# Patient Record
Sex: MALE | Race: WHITE | Employment: FULL TIME | URBAN - METROPOLITAN AREA
[De-identification: names, ages, dates, MRNs, and addresses within clinical notes are randomized per-mention and may not be internally consistent; named-entity substitution may affect disease eponyms.]

---

## 2017-03-09 ENCOUNTER — OFFICE VISIT (OUTPATIENT)
Dept: FAMILY MEDICINE CLINIC | Facility: CLINIC | Age: 37
End: 2017-03-09

## 2017-03-09 VITALS
BODY MASS INDEX: 26 KG/M2 | HEART RATE: 65 BPM | SYSTOLIC BLOOD PRESSURE: 116 MMHG | WEIGHT: 158 LBS | DIASTOLIC BLOOD PRESSURE: 73 MMHG

## 2017-03-09 DIAGNOSIS — M54.50 ACUTE MIDLINE LOW BACK PAIN WITHOUT SCIATICA: Primary | ICD-10-CM

## 2017-03-09 PROCEDURE — 99212 OFFICE O/P EST SF 10 MIN: CPT | Performed by: FAMILY MEDICINE

## 2017-03-09 PROCEDURE — 99213 OFFICE O/P EST LOW 20 MIN: CPT | Performed by: FAMILY MEDICINE

## 2017-03-09 NOTE — PROGRESS NOTES
Blood pressure 116/73, pulse 65, weight 158 lb (71.668 kg). PATIENT COMPLAINING OF INTERMITTENT BACK PAIN FOR ONE WEEK. NO LEG PAIN FELL ONCE PLAYING SOCCER NO OTHER TRAUMA. NO BOWEL OR BLADDER DYSFUNCTION HE GETS RELIEF WITH IBUPROFEN.         OBJECTIVE

## 2017-03-16 ENCOUNTER — OFFICE VISIT (OUTPATIENT)
Dept: PHYSICAL THERAPY | Age: 37
End: 2017-03-16
Attending: FAMILY MEDICINE
Payer: COMMERCIAL

## 2017-03-16 DIAGNOSIS — M54.50 ACUTE MIDLINE LOW BACK PAIN WITHOUT SCIATICA: Primary | ICD-10-CM

## 2017-03-16 PROCEDURE — 97110 THERAPEUTIC EXERCISES: CPT

## 2017-03-16 PROCEDURE — 97161 PT EVAL LOW COMPLEX 20 MIN: CPT

## 2017-03-16 NOTE — PROGRESS NOTES
LUMBAR SPINE EVALUATION:   Referring Physician: Dr. Gonzalez Friend  Diagnosis: Acute midline low back pain without sciatica (M54.5)    Date of Service: 3/16/2017   Date of Onset: more than 1 year    PATIENT SUMMARY   Yeimy Jay is a 39year old y/o male mechanics for performing planks which may place stress through low back. Decreased R hip mobility may place increased stress through lumbar spine with walking and running.   [address] would benefit from skilled Physical Therapy to address the above impairments management of symptoms  2. In 4 weeks, pt will demo improved hip strength by at least 1 full grade for all deficit motions for improved stability with running and playing soccer  3.  In 4 weeks, pt will demo pain-free lumbar spine range of motion for improv

## 2017-03-17 ENCOUNTER — OFFICE VISIT (OUTPATIENT)
Dept: PHYSICAL THERAPY | Age: 37
End: 2017-03-17
Attending: FAMILY MEDICINE
Payer: COMMERCIAL

## 2017-03-17 PROCEDURE — 97110 THERAPEUTIC EXERCISES: CPT

## 2017-03-17 NOTE — PROGRESS NOTES
Acute midline low back pain without sciatica (M54.5)   Authorized # of Visits:  2/8         Next MD visit: none scheduled  Fall Risk: standard         Precautions: n/a           Medication Changes since last visit?: No  Subjective: Pt 10mins late for appt.

## 2017-03-18 ENCOUNTER — LAB ENCOUNTER (OUTPATIENT)
Dept: LAB | Age: 37
End: 2017-03-18
Attending: PEDIATRICS
Payer: COMMERCIAL

## 2017-03-18 DIAGNOSIS — D57.3 SICKLE-CELL TRAIT (HCC): Primary | ICD-10-CM

## 2017-03-18 PROCEDURE — 85660 RBC SICKLE CELL TEST: CPT

## 2017-03-18 PROCEDURE — 83021 HEMOGLOBIN CHROMOTOGRAPHY: CPT

## 2017-03-18 PROCEDURE — 36415 COLL VENOUS BLD VENIPUNCTURE: CPT

## 2017-03-18 PROCEDURE — 83020 HEMOGLOBIN ELECTROPHORESIS: CPT

## 2017-03-20 ENCOUNTER — OFFICE VISIT (OUTPATIENT)
Dept: PHYSICAL THERAPY | Age: 37
End: 2017-03-20
Attending: FAMILY MEDICINE
Payer: COMMERCIAL

## 2017-03-20 PROCEDURE — 97110 THERAPEUTIC EXERCISES: CPT

## 2017-03-20 NOTE — PROGRESS NOTES
Acute midline low back pain without sciatica (M54.5)   Authorized # of Visits:  3/8         Next MD visit: none scheduled  Fall Risk: standard         Precautions: n/a           Medication Changes since last visit?: No  Subjective: Pt reports back has been

## 2017-03-22 ENCOUNTER — OFFICE VISIT (OUTPATIENT)
Dept: PHYSICAL THERAPY | Age: 37
End: 2017-03-22
Attending: FAMILY MEDICINE
Payer: COMMERCIAL

## 2017-03-22 PROCEDURE — 97110 THERAPEUTIC EXERCISES: CPT

## 2017-03-22 NOTE — PROGRESS NOTES
Acute midline low back pain without sciatica (M54.5)   Authorized # of Visits:  4/10         Next MD visit: none scheduled  Fall Risk: standard         Precautions: n/a           Medication Changes since last visit?: No  Subjective: Pt reports back is feel as able. Progress to BTB for sidely hip abd and sidesteps as able. Charges:  TherEx 3       Total Timed Treatment: 39 min  Total Treatment Time: 40 min

## 2017-03-24 LAB
HGB A2 MFR BLD: 2.3 % (ref 1.5–3.5)
HGB F MFR BLD: 0 % (ref 0–2)
HGB PNL BLD ELPH: 59.5 % (ref 95.5–100)
HGB S BLD QL SOLY: POSITIVE
HGB S MFR BLD: 37.1 %

## 2017-03-27 ENCOUNTER — OFFICE VISIT (OUTPATIENT)
Dept: PHYSICAL THERAPY | Age: 37
End: 2017-03-27
Attending: FAMILY MEDICINE
Payer: COMMERCIAL

## 2017-03-27 PROCEDURE — 97110 THERAPEUTIC EXERCISES: CPT

## 2017-03-27 NOTE — PROGRESS NOTES
Acute midline low back pain without sciatica (M54.5)   Authorized # of Visits:  5/10         Next MD visit: none scheduled  Fall Risk: standard         Precautions: n/a           Medication Changes since last visit?: No  Subjective: Pt reports no more back management of symptoms-MET  2. In 4 weeks, pt will demo improved hip strength by at least 1 full grade for all deficit motions for improved stability with running and playing soccer  3.  In 4 weeks, pt will demo pain-free lumbar spine range of motion for im

## 2017-03-30 ENCOUNTER — APPOINTMENT (OUTPATIENT)
Dept: PHYSICAL THERAPY | Age: 37
End: 2017-03-30
Attending: FAMILY MEDICINE
Payer: COMMERCIAL

## 2017-04-03 ENCOUNTER — APPOINTMENT (OUTPATIENT)
Dept: PHYSICAL THERAPY | Age: 37
End: 2017-04-03
Attending: FAMILY MEDICINE
Payer: COMMERCIAL

## 2017-04-05 ENCOUNTER — TELEPHONE (OUTPATIENT)
Dept: PHYSICAL THERAPY | Age: 37
End: 2017-04-05

## 2017-04-06 ENCOUNTER — APPOINTMENT (OUTPATIENT)
Dept: PHYSICAL THERAPY | Age: 37
End: 2017-04-06
Attending: FAMILY MEDICINE
Payer: COMMERCIAL

## 2017-04-07 ENCOUNTER — TELEPHONE (OUTPATIENT)
Dept: PHYSICAL THERAPY | Age: 37
End: 2017-04-07

## 2017-04-07 NOTE — TELEPHONE ENCOUNTER
Left VM stating next visit and informing pt if he misses one more session without notice, he will be discharged. Asked pt to call back to confirm or cancel remaining sessions.

## 2017-04-11 ENCOUNTER — APPOINTMENT (OUTPATIENT)
Dept: PHYSICAL THERAPY | Age: 37
End: 2017-04-11
Attending: FAMILY MEDICINE
Payer: COMMERCIAL

## 2017-04-13 ENCOUNTER — APPOINTMENT (OUTPATIENT)
Dept: PHYSICAL THERAPY | Age: 37
End: 2017-04-13
Attending: FAMILY MEDICINE
Payer: COMMERCIAL

## 2017-04-14 ENCOUNTER — APPOINTMENT (OUTPATIENT)
Dept: PHYSICAL THERAPY | Age: 37
End: 2017-04-14
Attending: FAMILY MEDICINE
Payer: COMMERCIAL

## 2018-06-06 ENCOUNTER — OFFICE VISIT (OUTPATIENT)
Dept: FAMILY MEDICINE CLINIC | Facility: CLINIC | Age: 38
End: 2018-06-06

## 2018-06-06 VITALS
HEIGHT: 66 IN | DIASTOLIC BLOOD PRESSURE: 76 MMHG | HEART RATE: 72 BPM | RESPIRATION RATE: 18 BRPM | SYSTOLIC BLOOD PRESSURE: 118 MMHG | BODY MASS INDEX: 25.71 KG/M2 | WEIGHT: 160 LBS

## 2018-06-06 DIAGNOSIS — E78.5 HYPERLIPIDEMIA, UNSPECIFIED HYPERLIPIDEMIA TYPE: ICD-10-CM

## 2018-06-06 DIAGNOSIS — Z00.00 ROUTINE PHYSICAL EXAMINATION: Primary | ICD-10-CM

## 2018-06-06 DIAGNOSIS — K21.9 GASTROESOPHAGEAL REFLUX DISEASE WITHOUT ESOPHAGITIS: ICD-10-CM

## 2018-06-06 PROCEDURE — 90715 TDAP VACCINE 7 YRS/> IM: CPT | Performed by: FAMILY MEDICINE

## 2018-06-06 PROCEDURE — 99395 PREV VISIT EST AGE 18-39: CPT | Performed by: FAMILY MEDICINE

## 2018-06-06 PROCEDURE — 90471 IMMUNIZATION ADMIN: CPT | Performed by: FAMILY MEDICINE

## 2018-06-06 RX ORDER — MOMETASONE FUROATE 1 MG/G
1 CREAM TOPICAL 2 TIMES DAILY PRN
Qty: 60 G | Refills: 0 | Status: SHIPPED | OUTPATIENT
Start: 2018-06-06 | End: 2020-07-13

## 2018-06-06 RX ORDER — PANTOPRAZOLE SODIUM 40 MG/1
40 TABLET, DELAYED RELEASE ORAL
Qty: 30 TABLET | Refills: 3 | Status: SHIPPED | OUTPATIENT
Start: 2018-06-06 | End: 2018-12-10

## 2018-06-06 NOTE — PROGRESS NOTES
Blood pressure 118/76, pulse 72, resp. rate 18, height 5' 6\" (1.676 m), weight 160 lb (72.6 kg). REASON FOR VISIT:    Jared Mallory is a 44year old male who presents for an [address]         Patient Active Problem List:     Chest pain found for: [facility]       SPECIFIC DISEASE MONITORING Internal Lab or Procedure   No disease specific diagnoses    ALLERGIES:   No Known Allergies  CURRENT MEDICATIONS:     Current Outpatient Prescriptions:  omeprazole (PRILOSEC) 20 MG Oral Capsule Delay good BS's, no masses, HSM or tenderness  : two descended testes, no masses, no hernia, no penile lesions  RECTAL: deferred  MUSCULOSKELETAL: back is not tender, FROM of the back  EXTREMITIES: no cyanosis, clubbing or edema  NEURO: Oriented times three, c

## 2018-06-09 ENCOUNTER — APPOINTMENT (OUTPATIENT)
Dept: LAB | Age: 38
End: 2018-06-09
Attending: FAMILY MEDICINE
Payer: COMMERCIAL

## 2018-06-09 DIAGNOSIS — E78.5 HYPERLIPIDEMIA, UNSPECIFIED HYPERLIPIDEMIA TYPE: ICD-10-CM

## 2018-06-09 PROCEDURE — 84450 TRANSFERASE (AST) (SGOT): CPT

## 2018-06-09 PROCEDURE — 80048 BASIC METABOLIC PNL TOTAL CA: CPT

## 2018-06-09 PROCEDURE — 84460 ALANINE AMINO (ALT) (SGPT): CPT

## 2018-06-09 PROCEDURE — 84443 ASSAY THYROID STIM HORMONE: CPT

## 2018-06-09 PROCEDURE — 80061 LIPID PANEL: CPT

## 2018-06-09 PROCEDURE — 36415 COLL VENOUS BLD VENIPUNCTURE: CPT

## 2018-06-18 ENCOUNTER — APPOINTMENT (OUTPATIENT)
Dept: LAB | Age: 38
End: 2018-06-18
Attending: FAMILY MEDICINE
Payer: COMMERCIAL

## 2018-06-18 DIAGNOSIS — K21.9 GASTROESOPHAGEAL REFLUX DISEASE WITHOUT ESOPHAGITIS: ICD-10-CM

## 2018-06-18 PROCEDURE — 87338 HPYLORI STOOL AG IA: CPT

## 2018-08-02 ENCOUNTER — TELEPHONE (OUTPATIENT)
Dept: GASTROENTEROLOGY | Facility: CLINIC | Age: 38
End: 2018-08-02

## 2018-08-02 ENCOUNTER — OFFICE VISIT (OUTPATIENT)
Dept: GASTROENTEROLOGY | Facility: CLINIC | Age: 38
End: 2018-08-02
Payer: COMMERCIAL

## 2018-08-02 VITALS
WEIGHT: 163 LBS | HEIGHT: 66.5 IN | DIASTOLIC BLOOD PRESSURE: 73 MMHG | HEART RATE: 72 BPM | BODY MASS INDEX: 25.89 KG/M2 | SYSTOLIC BLOOD PRESSURE: 109 MMHG

## 2018-08-02 DIAGNOSIS — K21.9 GASTROESOPHAGEAL REFLUX DISEASE, ESOPHAGITIS PRESENCE NOT SPECIFIED: Primary | ICD-10-CM

## 2018-08-02 DIAGNOSIS — K21.9 GASTROESOPHAGEAL REFLUX DISEASE WITHOUT ESOPHAGITIS: Primary | ICD-10-CM

## 2018-08-02 PROCEDURE — 99212 OFFICE O/P EST SF 10 MIN: CPT | Performed by: INTERNAL MEDICINE

## 2018-08-02 PROCEDURE — 99243 OFF/OP CNSLTJ NEW/EST LOW 30: CPT | Performed by: INTERNAL MEDICINE

## 2018-08-02 NOTE — PATIENT INSTRUCTIONS
GERD  - diet changes, avoid spicy foods  - ok to stay on omeprazole for now  - EGD with MAC sedation, ok for Elliot      Tips to Control Acid Reflux    To control acid reflux, you’ll need to make some basic diet and lifestyle changes.  The simple steps outli

## 2018-08-02 NOTE — TELEPHONE ENCOUNTER
Scheduled for: EGD 51607   Provider Name: Dr. Bill Decker  Date:  9/6/18  Location:  [facility]  Sedation:  MAC  Time:  9404 (pt is aware to arrive at 0945)   Prep:  NPO after midnight  Meds/Allergies Reconciled?:  Physician reviewed   Diagnosis with codes:  GERD K21. 9

## 2018-08-03 NOTE — PROGRESS NOTES
Chen Gill is a 44year old male.     HPI:   Patient presents with:  Gastro-esophageal Reflux: patient was treated with protonix ,took for a month ,has been on otc prilosec ,    The patient is a 50-year-old male who presents with chronic reflux breath,  No neurologic or dermatologic symptoms. PHYSICAL EXAM:   Blood pressure 109/73, pulse 72, height 5' 6.5\" (1.689 m), weight 163 lb (73.9 kg).     The patient appears their stated age and is in no acute distress  HEENT- anicteric sclera, neck no

## 2018-09-06 ENCOUNTER — ANESTHESIA (OUTPATIENT)
Dept: ENDOSCOPY | Facility: HOSPITAL | Age: 38
End: 2018-09-06
Payer: COMMERCIAL

## 2018-09-06 ENCOUNTER — HOSPITAL ENCOUNTER (OUTPATIENT)
Facility: HOSPITAL | Age: 38
Setting detail: HOSPITAL OUTPATIENT SURGERY
Discharge: HOME | End: 2018-09-06
Attending: INTERNAL MEDICINE | Admitting: INTERNAL MEDICINE
Payer: COMMERCIAL

## 2018-09-06 ENCOUNTER — ANESTHESIA EVENT (OUTPATIENT)
Dept: ENDOSCOPY | Facility: HOSPITAL | Age: 38
End: 2018-09-06
Payer: COMMERCIAL

## 2018-09-06 VITALS
BODY MASS INDEX: 25.11 KG/M2 | DIASTOLIC BLOOD PRESSURE: 86 MMHG | WEIGHT: 160 LBS | HEART RATE: 82 BPM | RESPIRATION RATE: 17 BRPM | HEIGHT: 67 IN | OXYGEN SATURATION: 97 % | SYSTOLIC BLOOD PRESSURE: 100 MMHG

## 2018-09-06 DIAGNOSIS — K21.9 GASTROESOPHAGEAL REFLUX DISEASE, ESOPHAGITIS PRESENCE NOT SPECIFIED: ICD-10-CM

## 2018-09-06 PROCEDURE — 43235 EGD DIAGNOSTIC BRUSH WASH: CPT | Performed by: INTERNAL MEDICINE

## 2018-09-06 PROCEDURE — 0DJ08ZZ INSPECTION OF UPPER INTESTINAL TRACT, VIA NATURAL OR ARTIFICIAL OPENING ENDOSCOPIC: ICD-10-PCS | Performed by: INTERNAL MEDICINE

## 2018-09-06 RX ORDER — MIDAZOLAM HYDROCHLORIDE 1 MG/ML
INJECTION INTRAMUSCULAR; INTRAVENOUS AS NEEDED
Status: DISCONTINUED | OUTPATIENT
Start: 2018-09-06 | End: 2018-09-06 | Stop reason: SURG

## 2018-09-06 RX ORDER — NALOXONE HYDROCHLORIDE 0.4 MG/ML
80 INJECTION, SOLUTION INTRAMUSCULAR; INTRAVENOUS; SUBCUTANEOUS AS NEEDED
Status: DISCONTINUED | OUTPATIENT
Start: 2018-09-06 | End: 2018-09-06

## 2018-09-06 RX ORDER — SODIUM CHLORIDE, SODIUM LACTATE, POTASSIUM CHLORIDE, CALCIUM CHLORIDE 600; 310; 30; 20 MG/100ML; MG/100ML; MG/100ML; MG/100ML
INJECTION, SOLUTION INTRAVENOUS CONTINUOUS
Status: DISCONTINUED | OUTPATIENT
Start: 2018-09-06 | End: 2018-09-06

## 2018-09-06 RX ORDER — LIDOCAINE HYDROCHLORIDE 10 MG/ML
INJECTION, SOLUTION EPIDURAL; INFILTRATION; INTRACAUDAL; PERINEURAL AS NEEDED
Status: DISCONTINUED | OUTPATIENT
Start: 2018-09-06 | End: 2018-09-06 | Stop reason: SURG

## 2018-09-06 RX ADMIN — LIDOCAINE HYDROCHLORIDE 20 MG: 10 INJECTION, SOLUTION EPIDURAL; INFILTRATION; INTRACAUDAL; PERINEURAL at 11:03:00

## 2018-09-06 RX ADMIN — MIDAZOLAM HYDROCHLORIDE 2 MG: 1 INJECTION INTRAMUSCULAR; INTRAVENOUS at 11:01:00

## 2018-09-06 RX ADMIN — SODIUM CHLORIDE, SODIUM LACTATE, POTASSIUM CHLORIDE, CALCIUM CHLORIDE: 600; 310; 30; 20 INJECTION, SOLUTION INTRAVENOUS at 11:16:00

## 2018-09-06 NOTE — H&P
History & Physical Examination    Patient Name: Walker Franks  MRN: [id number]  [facility]: [id number]  YOB: 1980    Diagnosis: gerd    Prescriptions Prior to Admission:  Pantoprazole Sodium 40 MG Oral Tab EC Take 1 tablet (40 mg total) b

## 2018-09-06 NOTE — ANESTHESIA PREPROCEDURE EVALUATION
Anesthesia PreOp Note    HPI:     Andria Kwan is a 44year old male who presents for preoperative consultation requested by: Ian Gil MD    Date of Surgery: 9/6/2018    Procedure(s):  ESOPHAGOGASTRODUODENOSCOPY (EGD)  Indication: Janes Santana status:   Spouse name: N/A    Years of education: N/A  Number of children: N/A     Occupational History  None on file     Social History Main Topics   Smoking status: Never Smoker    Smokeless tobacco: Never Used    Alcohol use Yes  6.6 oz/week    1 best of my ability. The patient desires the anesthetic management as planned.   Talha Yeung  9/6/2018 10:59 AM

## 2018-09-06 NOTE — OPERATIVE REPORT
[facility] Endoscopy Report      Preoperative Diagnosis:  - refractory GERD      Postoperative Diagnosis:  - reflux esophagitis      Procedure:    Esophagogastroduodenoscopy       Surgeon:  Bere Sandhu M.D.     Anesthesia:  MAC sedation,

## 2018-09-06 NOTE — ANESTHESIA POSTPROCEDURE EVALUATION
Patient: Peter Adams    Procedure Summary     Date:  09/06/18 Room / Location:  [address] [place] [address] ENDOSCOPY 03 / [address] [place] [address] ENDOSCOPY    Anesthesia Start:  1100 Anesthesia Stop:  2054    Procedure:  ESOPHAGOGASTRODUODENOSCOPY (EGD) (N/A ) Diagnosis:       Dennis Brooks

## 2018-12-10 RX ORDER — PANTOPRAZOLE SODIUM 40 MG/1
TABLET, DELAYED RELEASE ORAL
Qty: 30 TABLET | Refills: 0 | Status: SHIPPED | OUTPATIENT
Start: 2018-12-10 | End: 2019-01-06

## 2019-01-06 RX ORDER — PANTOPRAZOLE SODIUM 40 MG/1
TABLET, DELAYED RELEASE ORAL
Qty: 90 TABLET | Refills: 3 | Status: SHIPPED | OUTPATIENT
Start: 2019-01-06 | End: 2020-07-13

## 2019-08-26 ENCOUNTER — TELEPHONE (OUTPATIENT)
Dept: FAMILY MEDICINE CLINIC | Facility: CLINIC | Age: 39
End: 2019-08-26

## 2020-05-18 ENCOUNTER — LAB ENCOUNTER (OUTPATIENT)
Dept: LAB | Facility: HOSPITAL | Age: 40
End: 2020-05-18
Attending: FAMILY MEDICINE
Payer: COMMERCIAL

## 2020-05-18 ENCOUNTER — TELEMEDICINE (OUTPATIENT)
Dept: FAMILY MEDICINE CLINIC | Facility: CLINIC | Age: 40
End: 2020-05-18
Payer: COMMERCIAL

## 2020-05-18 ENCOUNTER — NURSE TRIAGE (OUTPATIENT)
Dept: FAMILY MEDICINE CLINIC | Facility: CLINIC | Age: 40
End: 2020-05-18

## 2020-05-18 DIAGNOSIS — R50.9 FEVER, UNSPECIFIED FEVER CAUSE: ICD-10-CM

## 2020-05-18 DIAGNOSIS — R50.9 FEVER, UNSPECIFIED FEVER CAUSE: Primary | ICD-10-CM

## 2020-05-18 PROCEDURE — 99213 OFFICE O/P EST LOW 20 MIN: CPT | Performed by: FAMILY MEDICINE

## 2020-05-18 NOTE — TELEPHONE ENCOUNTER
Please reply to pool: EM RN TRIAGE    Action Requested: Summary for Provider     []  Critical Lab, Recommendations Needed  [] Need Additional Advice  []   FYI    []   Need Orders  [] Need Medications Sent to Pharmacy  []  Other     SUMMARY: Offered Doxim

## 2020-05-18 NOTE — PROGRESS NOTES
VIDEO VISIT    Presents today reporting that he has been exposed to COVID-19. He reports that he had a temperature of 104 degrees yesterday. He does have a cough. Denies nocturnal cough or dyspnea. No sore throat.   No nasal congestion or phlegm product

## 2020-05-20 ENCOUNTER — TELEPHONE (OUTPATIENT)
Dept: FAMILY MEDICINE CLINIC | Facility: CLINIC | Age: 40
End: 2020-05-20

## 2020-05-20 ENCOUNTER — PATIENT OUTREACH (OUTPATIENT)
Dept: CASE MANAGEMENT | Age: 40
End: 2020-05-20

## 2020-05-20 NOTE — TELEPHONE ENCOUNTER
Spoke with patient today for day 1 home monitoring. Patient tested COVID+ on 5/18/20.  Patient feels his symptoms have returned to normal as of Monday (day he was tested) and patient is asking if he can start taking Aspirin as he saw on the news, patients

## 2020-05-20 NOTE — PROGRESS NOTES
Home Monitoring Condition Update    Covid19+ test date: 5/18/20      Consent Verification:  Assessment Completed With: Patient  HIPAA Verified?   Yes    COVID-19 HOME MONITORING 5/20/2020   Temperature 97.8   Reading From Mouth   Pulse 77   Exertion Level understanding and denies any concerns at time of call. Patient advised to inform their Employee Health department or Manager when they have tested positive for COVID-19.       The patient was also directed to continue to isolate away from other househo

## 2020-05-21 ENCOUNTER — PATIENT OUTREACH (OUTPATIENT)
Dept: CASE MANAGEMENT | Age: 40
End: 2020-05-21

## 2020-05-21 NOTE — PROGRESS NOTES
Home Monitoring Condition Update    Covid19+ test date: 5/18/2020      Consent Verification:  Assessment Completed With: Patient  HIPAA Verified?   Yes    COVID-19 HOME MONITORING 5/21/2020   Temperature 98   Reading From Mouth   Pulse 79   Pulse taken fr with worsening symptoms,  Patient encouraged to move about the room to prevent blood clots--patient verbalizes understanding and agreement. Patient verbalized understanding that a nurse will be contacting him/her tomorrow.   He will also do his breathing

## 2020-05-21 NOTE — TELEPHONE ENCOUNTER
The patient was informed. He would like to know what can he take for his occasional wheezing. Denies any shortness of breath. He also has a runny nose after sneezing and is asking if he can take a Claritin? Denies any post nasal drip or watery eyes.

## 2020-05-22 ENCOUNTER — PATIENT OUTREACH (OUTPATIENT)
Dept: CASE MANAGEMENT | Age: 40
End: 2020-05-22

## 2020-05-22 NOTE — PROGRESS NOTES
Home Monitoring Condition Update    Covid19+ test date: 5/18/2020      Consent Verification:  Assessment Completed With: Patient  HIPAA Verified?   Yes    COVID-19 HOME MONITORING 5/22/2020   Temperature 97.3   Reading From Mouth   Pulse 74   Pulse taken DO [facility] [place]           Patient advised to inform their Employee Health department or Manager when they have tested positive for COVID-19.       The patient was also directed to continue to isolate away from other household members when possible and

## 2020-05-26 ENCOUNTER — VIRTUAL PHONE E/M (OUTPATIENT)
Dept: FAMILY MEDICINE CLINIC | Facility: CLINIC | Age: 40
End: 2020-05-26
Payer: COMMERCIAL

## 2020-05-26 DIAGNOSIS — U07.1 COVID-19: Primary | ICD-10-CM

## 2020-05-26 PROCEDURE — 99441 PHONE E/M BY PHYS 5-10 MIN: CPT | Performed by: FAMILY MEDICINE

## 2020-05-26 NOTE — PROGRESS NOTES
Virtual Telephone Check-In    Michael Santos verbally consents to a Virtual/Telephone Check-In visit on 05/26/20. Patient has been referred to the [facility] website at [email or web address] to review the yearly Consent to Treat document.     Patient

## 2020-07-03 ENCOUNTER — TELEMEDICINE (OUTPATIENT)
Dept: FAMILY MEDICINE CLINIC | Facility: CLINIC | Age: 40
End: 2020-07-03

## 2020-07-03 DIAGNOSIS — J34.89 NASAL OBSTRUCTION: Primary | ICD-10-CM

## 2020-07-03 PROCEDURE — 99213 OFFICE O/P EST LOW 20 MIN: CPT | Performed by: FAMILY MEDICINE

## 2020-07-03 NOTE — PROGRESS NOTES
VIDEO VISIT    Patient presents today complaining of snoring and frequent excessive daytime somnolence. He reports he has broken his nose previously. He reports he falls asleep on long car rides and when he is watching television.     Objective patient is

## 2020-07-11 ENCOUNTER — OFFICE VISIT (OUTPATIENT)
Dept: FAMILY MEDICINE CLINIC | Facility: CLINIC | Age: 40
End: 2020-07-11
Payer: COMMERCIAL

## 2020-07-11 VITALS
BODY MASS INDEX: 26.37 KG/M2 | HEIGHT: 67 IN | DIASTOLIC BLOOD PRESSURE: 83 MMHG | SYSTOLIC BLOOD PRESSURE: 119 MMHG | WEIGHT: 168 LBS | HEART RATE: 66 BPM

## 2020-07-11 DIAGNOSIS — R73.01 IMPAIRED FASTING GLUCOSE: ICD-10-CM

## 2020-07-11 DIAGNOSIS — E78.01 FAMILIAL HYPERCHOLESTEROLEMIA: ICD-10-CM

## 2020-07-11 DIAGNOSIS — J34.89 NASAL OBSTRUCTION: Primary | ICD-10-CM

## 2020-07-11 PROCEDURE — 99213 OFFICE O/P EST LOW 20 MIN: CPT | Performed by: FAMILY MEDICINE

## 2020-07-11 RX ORDER — FLUTICASONE PROPIONATE 50 MCG
2 SPRAY, SUSPENSION (ML) NASAL DAILY
Qty: 1 BOTTLE | Refills: 3 | Status: SHIPPED | OUTPATIENT
Start: 2020-07-11 | End: 2020-07-13

## 2020-07-11 NOTE — PROGRESS NOTES
Blood pressure 119/83, pulse 66, height 5' 7\" (1.702 m), weight 168 lb (76.2 kg). Patient presents today complaining of constant nasal obstruction and snoring. He does have somnolence. He has been tested for sleep apnea previously was negative.   He h

## 2020-07-13 ENCOUNTER — TELEPHONE (OUTPATIENT)
Dept: FAMILY MEDICINE CLINIC | Facility: CLINIC | Age: 40
End: 2020-07-13

## 2020-07-13 RX ORDER — MOMETASONE 50 UG/1
1 SPRAY, METERED NASAL DAILY
Qty: 1 INHALER | Refills: 2 | Status: SHIPPED | OUTPATIENT
Start: 2020-07-13 | End: 2021-05-07 | Stop reason: ALTCHOICE

## 2020-07-13 NOTE — TELEPHONE ENCOUNTER
Fax received requesting alternative. Note states plan does not cover med prescribed. Per RX benefit plan alternative meds include:  Mometasone spr 50mg. Please fax back with approval along with strength, directions, quantity and refills.      Current Out

## 2020-07-16 ENCOUNTER — OFFICE VISIT (OUTPATIENT)
Dept: OTOLARYNGOLOGY | Facility: CLINIC | Age: 40
End: 2020-07-16
Payer: COMMERCIAL

## 2020-07-16 VITALS
HEIGHT: 66 IN | BODY MASS INDEX: 27.32 KG/M2 | SYSTOLIC BLOOD PRESSURE: 118 MMHG | TEMPERATURE: 98 F | WEIGHT: 170 LBS | DIASTOLIC BLOOD PRESSURE: 82 MMHG

## 2020-07-16 DIAGNOSIS — J34.2 DEVIATED NASAL SEPTUM: ICD-10-CM

## 2020-07-16 DIAGNOSIS — G47.30 SLEEP APNEA, UNSPECIFIED TYPE: Primary | ICD-10-CM

## 2020-07-16 PROCEDURE — 99243 OFF/OP CNSLTJ NEW/EST LOW 30: CPT | Performed by: OTOLARYNGOLOGY

## 2020-07-16 PROCEDURE — 3008F BODY MASS INDEX DOCD: CPT | Performed by: OTOLARYNGOLOGY

## 2020-07-16 PROCEDURE — 3074F SYST BP LT 130 MM HG: CPT | Performed by: OTOLARYNGOLOGY

## 2020-07-16 PROCEDURE — 3079F DIAST BP 80-89 MM HG: CPT | Performed by: OTOLARYNGOLOGY

## 2020-07-16 RX ORDER — OMEPRAZOLE 20 MG/1
20 CAPSULE, DELAYED RELEASE ORAL EVERY MORNING
COMMUNITY
End: 2021-05-07 | Stop reason: ALTCHOICE

## 2020-07-16 RX ORDER — MONTELUKAST SODIUM 10 MG/1
10 TABLET ORAL NIGHTLY
Qty: 30 TABLET | Refills: 3 | Status: SHIPPED | OUTPATIENT
Start: 2020-07-16 | End: 2021-05-07 | Stop reason: ALTCHOICE

## 2020-07-16 RX ORDER — AZELASTINE 1 MG/ML
2 SPRAY, METERED NASAL 2 TIMES DAILY
Qty: 1 BOTTLE | Refills: 3 | Status: SHIPPED | OUTPATIENT
Start: 2020-07-16 | End: 2021-05-07 | Stop reason: ALTCHOICE

## 2020-07-16 RX ORDER — OMEPRAZOLE 40 MG/1
40 CAPSULE, DELAYED RELEASE ORAL DAILY
Qty: 30 CAPSULE | Refills: 2 | Status: SHIPPED | OUTPATIENT
Start: 2020-07-16 | End: 2021-05-18

## 2020-07-17 ENCOUNTER — TELEPHONE (OUTPATIENT)
Dept: OTOLARYNGOLOGY | Facility: CLINIC | Age: 40
End: 2020-07-17

## 2020-07-17 NOTE — TELEPHONE ENCOUNTER
Rn spoke to pt but pt cannot remember said not sure Jorge L Dc tried to call but office were closed ,will try again.

## 2020-07-17 NOTE — PROGRESS NOTES
Shanna Peacock is a 35year old male.   Patient presents with:  Sinus Problem: pt stated feel congested  and hard to breath  Other: patient snores and woke grasping for breath      HISTORY OF PRESENT ILLNESS  He presents with a history of feeling c loss.   ENMT Negative Drooling. Eyes Negative Blurred vision and vision changes. Respiratory Negative Dyspnea and wheezing. Cardio Negative Chest pain, irregular heartbeat/palpitations and syncope. GI Negative Abdominal pain and diarrhea.    Stephan Soler Moderate hypertrophy       Current Outpatient Medications:   •  omeprazole 20 MG Oral Capsule Delayed Release, Take 20 mg by mouth every morning., Disp: , Rfl:   •  Montelukast Sodium 10 MG Oral Tab, Take 1 tablet (10 mg total) by mouth nightly., Disp: 30

## 2020-07-17 NOTE — TELEPHONE ENCOUNTER
Jennifer Black MD  [facility] Clinical Staff             Please find previous copy of sleep study performed years ago.  States that this was performed at home but ordered by an [facility] physician

## 2020-08-22 ENCOUNTER — OFFICE VISIT (OUTPATIENT)
Dept: OTOLARYNGOLOGY | Facility: CLINIC | Age: 40
End: 2020-08-22
Payer: COMMERCIAL

## 2020-08-22 VITALS
WEIGHT: 168 LBS | SYSTOLIC BLOOD PRESSURE: 122 MMHG | BODY MASS INDEX: 27 KG/M2 | TEMPERATURE: 97 F | HEIGHT: 66 IN | DIASTOLIC BLOOD PRESSURE: 80 MMHG

## 2020-08-22 DIAGNOSIS — G47.30 SLEEP APNEA, UNSPECIFIED TYPE: Primary | ICD-10-CM

## 2020-08-22 DIAGNOSIS — J34.2 DEVIATED NASAL SEPTUM: ICD-10-CM

## 2020-08-22 PROCEDURE — 3074F SYST BP LT 130 MM HG: CPT | Performed by: OTOLARYNGOLOGY

## 2020-08-22 PROCEDURE — 99213 OFFICE O/P EST LOW 20 MIN: CPT | Performed by: OTOLARYNGOLOGY

## 2020-08-22 PROCEDURE — 3008F BODY MASS INDEX DOCD: CPT | Performed by: OTOLARYNGOLOGY

## 2020-08-22 PROCEDURE — 3079F DIAST BP 80-89 MM HG: CPT | Performed by: OTOLARYNGOLOGY

## 2020-08-22 RX ORDER — OMEPRAZOLE 40 MG/1
40 CAPSULE, DELAYED RELEASE ORAL DAILY
Qty: 30 CAPSULE | Refills: 3 | Status: SHIPPED | OUTPATIENT
Start: 2020-08-22 | End: 2020-12-11

## 2020-08-22 NOTE — PROGRESS NOTES
Spencer Myers is a 35year old male.   Patient presents with:  Sleep Problem: Patient here fro sleep apnea f/up      HISTORY OF PRESENT ILLNESS    He presents with a history of feeling congested in his nose and having difficulty breathing through History:   Diagnosis Date   • Acid reflux    • Esophageal reflux    • Lipid screening 1/2/2009       Past Surgical History:   Procedure Laterality Date   • ARTHROSCOPY OF JOINT UNLISTED     • ESOPHAGOGASTRODUODENOSCOPY (EGD) N/A 9/6/2018    Performed by Derik Varghese Detail Normal Submental. Submandibular. Anterior cervical. Posterior cervical. Supraclavicular.         Nose/Mouth/Throat Normal External nose - Normal. Lips/teeth/gums - Normal. Tonsils - Normal. Oropharynx - Normal.   Nose/Mouth/Throat Normal Nares - Righ

## 2020-08-24 ENCOUNTER — TELEPHONE (OUTPATIENT)
Dept: OTOLARYNGOLOGY | Facility: CLINIC | Age: 40
End: 2020-08-24

## 2020-08-24 NOTE — TELEPHONE ENCOUNTER
Rn spoke to pt need to sign VITA form to get the copy of Sleep Study from [facility] and Fax to#[phone],patient requesting to have the form fax to [phone] for pt to sign and send form back to our office fax#[phone].

## 2020-08-27 NOTE — TELEPHONE ENCOUNTER
Informed pt that Rn received a response from [facility] record and they don't have pt record there,pt cannot remember where he had the sleep study test done. Kwaku Chong made aware. Advised pt to call the doctor who ordered the test and ask for a

## 2020-12-11 RX ORDER — OMEPRAZOLE 40 MG/1
CAPSULE, DELAYED RELEASE ORAL
Qty: 30 CAPSULE | Refills: 2 | Status: SHIPPED | OUTPATIENT
Start: 2020-12-11 | End: 2021-02-17

## 2020-12-11 NOTE — TELEPHONE ENCOUNTER
This refill request is being sent to the provider for the following reason:    []Patient did not complete follow up recommendations,pt last visit on 8/22/20,please advise.

## 2021-02-17 RX ORDER — OMEPRAZOLE 40 MG/1
CAPSULE, DELAYED RELEASE ORAL
Qty: 30 CAPSULE | Refills: 2 | Status: SHIPPED | OUTPATIENT
Start: 2021-02-17 | End: 2021-04-30

## 2021-02-18 ENCOUNTER — TELEPHONE (OUTPATIENT)
Dept: OTOLARYNGOLOGY | Facility: CLINIC | Age: 41
End: 2021-02-18

## 2021-02-18 NOTE — TELEPHONE ENCOUNTER
Current Outpatient Medications   Medication Sig Dispense Refill   • OMEPRAZOLE 40 MG Oral Capsule Delayed Release TAKE 1 CAPSULE BY MOUTH EVERY DAY 30 capsule 2            Prior Auth received from [facility] [place]    Alternative RX requested/ [employer] fo

## 2021-02-18 NOTE — TELEPHONE ENCOUNTER
Rn tried to call #[phone] but [facility] line in under technical difficulty ,will try later.

## 2021-02-19 NOTE — TELEPHONE ENCOUNTER
Rn called  #[phone] [employer] to get prior auth for Omeprazole 40 mg per Roger Escobar it's approved [id number] valid from 2/19/21 thru 2/19/22. Rn informed pt pharmacy.

## 2021-03-03 DIAGNOSIS — Z23 NEED FOR VACCINATION: ICD-10-CM

## 2021-04-30 ENCOUNTER — OFFICE VISIT (OUTPATIENT)
Dept: FAMILY MEDICINE CLINIC | Facility: CLINIC | Age: 41
End: 2021-04-30
Payer: COMMERCIAL

## 2021-04-30 VITALS
SYSTOLIC BLOOD PRESSURE: 126 MMHG | HEART RATE: 67 BPM | BODY MASS INDEX: 26.52 KG/M2 | WEIGHT: 165 LBS | HEIGHT: 66 IN | DIASTOLIC BLOOD PRESSURE: 70 MMHG

## 2021-04-30 DIAGNOSIS — Z30.09 VASECTOMY EVALUATION: Primary | ICD-10-CM

## 2021-04-30 DIAGNOSIS — K21.9 GASTROESOPHAGEAL REFLUX DISEASE WITHOUT ESOPHAGITIS: ICD-10-CM

## 2021-04-30 PROCEDURE — 3074F SYST BP LT 130 MM HG: CPT | Performed by: FAMILY MEDICINE

## 2021-04-30 PROCEDURE — 3078F DIAST BP <80 MM HG: CPT | Performed by: FAMILY MEDICINE

## 2021-04-30 PROCEDURE — 99213 OFFICE O/P EST LOW 20 MIN: CPT | Performed by: FAMILY MEDICINE

## 2021-04-30 PROCEDURE — 3008F BODY MASS INDEX DOCD: CPT | Performed by: FAMILY MEDICINE

## 2021-04-30 NOTE — PATIENT INSTRUCTIONS
En qué consiste la vasectomía  La vasectomía es un procedimiento sencillo y seguro para impedir que un hombre pueda tener hijos (esterilización) y es el método anticonceptivo más eficaz en los hombres.   El sistema reproductor  Para que el embarazo sea po cierta cantidad de espermatozoides activos permanecen todavía en el sistema reproductor. Se necesita aproximadamente 3 meses y numerosas eyaculaciones hasta que el semen quede completamente clau de espermatozoides.  Mientras tanto, necesitará utilizar algú deferente. [place] puede ocurrir en casos muy poco frecuentes. Puede hacer que sea fértil de nuevo. [place] puede ocasionar un embarazo no planificado. · Anticuerpos espermáticos.  Desarrollar anticuerpos es vanesa reacción común del organismo a la absorción de mae reducir los síntomas. Si es necesario, martinez proveedor de atención médica puede recetarle medicamentos. Los síntomas suelen mejorar con el tratamiento, holli si jessy se suspende, los síntomas suelen regresar después de unos pocos meses.  Así que la mayoría de l funcionar mejor, holli tardan un poco más en surtir [place] un antiácido de 30 a 60 minutos después de comer y a la hora de [place], holli no a la misma hora que un bloqueador de ácido. Intente no amaury medicamentos beni ibuprofeno y [place].  Si es Pueden ayudar a aliviar sridhar síntomas. Mantenga la Allean Alderman  El reflujo ocurre con mayor frecuencia cuando se está [place], pues los fluidos estomacales pueden subir con mayor facilidad.  Levantar la cabecera de martinez cama de 4 a 6 pulgadas puede

## 2021-04-30 NOTE — PROGRESS NOTES
Blood pressure 126/70, pulse 67, height 5' 6\" (1.676 m), weight 165 lb (74.8 kg). Patient presents today following up for GERD. Last endoscopy almost 3 years ago. Still requires omeprazole 40 mg daily.   Also requesting vasectomy referral and informat

## 2021-05-07 ENCOUNTER — OFFICE VISIT (OUTPATIENT)
Dept: OTOLARYNGOLOGY | Facility: CLINIC | Age: 41
End: 2021-05-07
Payer: COMMERCIAL

## 2021-05-07 VITALS
SYSTOLIC BLOOD PRESSURE: 106 MMHG | BODY MASS INDEX: 26.52 KG/M2 | DIASTOLIC BLOOD PRESSURE: 66 MMHG | HEIGHT: 66.5 IN | WEIGHT: 167 LBS

## 2021-05-07 DIAGNOSIS — K21.9 GASTROESOPHAGEAL REFLUX DISEASE, UNSPECIFIED WHETHER ESOPHAGITIS PRESENT: Primary | ICD-10-CM

## 2021-05-07 PROCEDURE — 3078F DIAST BP <80 MM HG: CPT | Performed by: OTOLARYNGOLOGY

## 2021-05-07 PROCEDURE — 3008F BODY MASS INDEX DOCD: CPT | Performed by: OTOLARYNGOLOGY

## 2021-05-07 PROCEDURE — 99214 OFFICE O/P EST MOD 30 MIN: CPT | Performed by: OTOLARYNGOLOGY

## 2021-05-07 PROCEDURE — 3074F SYST BP LT 130 MM HG: CPT | Performed by: OTOLARYNGOLOGY

## 2021-05-07 NOTE — PROGRESS NOTES
Zion Carrera is a 38year old male. Patient presents with: Follow - Up: acid reflux patient states the medication is controlling his gerd symptoms .       HISTORY OF PRESENT ILLNESS  He presents with a history of feeling congested in his nose a Not on file      Years of education: Not on file      Highest education level: Not on file    Tobacco Use      Smoking status: Never Smoker      Smokeless tobacco: Never Used    Substance and Sexual Activity      Alcohol use:  Yes        Alcohol/week: 11.0 nerves II through XII grossly intact.    Head/Face Normal Facial features - Normal. Eyebrows - Normal. Skull - Normal.        Nasopharynx Normal External nose - Normal. Lips/teeth/gums - Normal. Tonsils - Normal. Oropharynx - Normal.   Ears Normal Inspectio

## 2021-05-10 ENCOUNTER — TELEPHONE (OUTPATIENT)
Dept: SURGERY | Facility: CLINIC | Age: 41
End: 2021-05-10

## 2021-05-17 ENCOUNTER — OFFICE VISIT (OUTPATIENT)
Dept: SURGERY | Facility: CLINIC | Age: 41
End: 2021-05-17
Payer: COMMERCIAL

## 2021-05-17 VITALS
DIASTOLIC BLOOD PRESSURE: 79 MMHG | HEART RATE: 76 BPM | WEIGHT: 166 LBS | HEIGHT: 66.5 IN | BODY MASS INDEX: 26.36 KG/M2 | SYSTOLIC BLOOD PRESSURE: 121 MMHG

## 2021-05-17 DIAGNOSIS — Z30.09 VASECTOMY EVALUATION: Primary | ICD-10-CM

## 2021-05-17 PROCEDURE — 3074F SYST BP LT 130 MM HG: CPT | Performed by: UROLOGY

## 2021-05-17 PROCEDURE — 3078F DIAST BP <80 MM HG: CPT | Performed by: UROLOGY

## 2021-05-17 PROCEDURE — 3008F BODY MASS INDEX DOCD: CPT | Performed by: UROLOGY

## 2021-05-17 PROCEDURE — 99244 OFF/OP CNSLTJ NEW/EST MOD 40: CPT | Performed by: UROLOGY

## 2021-05-17 RX ORDER — DIAZEPAM 10 MG/1
10 TABLET ORAL ONCE
Qty: 1 TABLET | Refills: 0 | Status: SHIPPED | OUTPATIENT
Start: 2021-05-17 | End: 2021-05-17

## 2021-05-17 RX ORDER — HYDROCODONE BITARTRATE AND ACETAMINOPHEN 5; 325 MG/1; MG/1
2 TABLET ORAL
Qty: 2 TABLET | Refills: 0 | Status: SHIPPED | OUTPATIENT
Start: 2021-05-17 | End: 2021-05-17

## 2021-05-17 NOTE — PROGRESS NOTES
[facility], [facility] Urology  Initial Office Consultation    HPI:   Neeta Crabtree is a 38year old male here today for consultation at the request of, and a copy of this note will be sent to, Luis Rendon DO.   He is accompanied by his wife who w icterus. Cardiovascular:      Rate and Rhythm: Normal rate. Pulmonary:      Effort: Pulmonary effort is normal.   Abdominal:      General: There is no distension. Palpations: Abdomen is soft. Tenderness: There is no abdominal tenderness.    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 · Repeat vasectomy is necessary in ?1% of vasectomies, provided that a technique for vas occlusion known to have a low occlusive failure rate has been used.     · Patients should re

## 2021-05-18 RX ORDER — OMEPRAZOLE 40 MG/1
CAPSULE, DELAYED RELEASE ORAL
Qty: 90 CAPSULE | Refills: 0 | Status: SHIPPED | OUTPATIENT
Start: 2021-05-18 | End: 2021-08-16

## 2021-05-19 ENCOUNTER — TELEPHONE (OUTPATIENT)
Dept: SURGERY | Facility: CLINIC | Age: 41
End: 2021-05-19

## 2021-05-19 NOTE — TELEPHONE ENCOUNTER
Called pt's insurance [employer] to obtain prior authorization for pts in-office Vasectomy scheduled 07/08/2021.  I spoke with Gem Batres who informed me that no prior authorization or pre determination was needed for this procedure with the codes provided ( [phone], [phone]

## 2021-07-06 ENCOUNTER — TELEPHONE (OUTPATIENT)
Dept: SURGERY | Facility: CLINIC | Age: 41
End: 2021-07-06

## 2021-07-06 RX ORDER — DIAZEPAM 10 MG/1
TABLET ORAL
Refills: 0 | Status: CANCELLED | OUTPATIENT
Start: 2021-07-06

## 2021-07-06 RX ORDER — HYDROCODONE BITARTRATE AND ACETAMINOPHEN 5; 325 MG/1; MG/1
TABLET ORAL
Refills: 0 | Status: CANCELLED | OUTPATIENT
Start: 2021-07-06

## 2021-07-06 RX ORDER — HYDROCODONE BITARTRATE AND ACETAMINOPHEN 5; 325 MG/1; MG/1
TABLET ORAL
COMMUNITY
Start: 2021-05-17

## 2021-07-06 RX ORDER — DIAZEPAM 10 MG/1
10 TABLET ORAL ONCE
Qty: 1 TABLET | Refills: 0 | Status: SHIPPED | OUTPATIENT
Start: 2021-07-06 | End: 2021-07-06

## 2021-07-06 RX ORDER — HYDROCODONE BITARTRATE AND ACETAMINOPHEN 5; 325 MG/1; MG/1
2 TABLET ORAL ONCE
Qty: 2 TABLET | Refills: 0 | Status: SHIPPED | OUTPATIENT
Start: 2021-07-06 | End: 2021-07-06

## 2021-07-06 RX ORDER — DIAZEPAM 10 MG/1
TABLET ORAL
COMMUNITY
Start: 2021-05-17

## 2021-07-06 NOTE — TELEPHONE ENCOUNTER
I called pt back verified name/ pt agrees to switch vasectomy to Friday at 10:00am pt states he need scripts sent to confirmed pharmacy he never picked up in May 2021 will sent to [facility]NY, Omari Kirby to sent to pharmacy, I also went over detail instr

## 2021-07-06 NOTE — TELEPHONE ENCOUNTER
TCB and to check pt precioust  Dr. Clifton Wade will not be in the office on Thursday July 8, 2021, when pt calls back I need to speak to him to see if he can come on Friday July 9, 2021.  PLEASE TRANSFER CALL TO Jose Castillo

## 2021-07-06 NOTE — TELEPHONE ENCOUNTER
Pt states ok for Friday 7/9 pt also lost scripts for medication to be taken before procedure please advise

## 2021-07-09 ENCOUNTER — PROCEDURE (OUTPATIENT)
Dept: SURGERY | Facility: CLINIC | Age: 41
End: 2021-07-09
Payer: COMMERCIAL

## 2021-07-09 VITALS — HEART RATE: 58 BPM | SYSTOLIC BLOOD PRESSURE: 121 MMHG | DIASTOLIC BLOOD PRESSURE: 83 MMHG

## 2021-07-09 DIAGNOSIS — Z98.52 S/P VASECTOMY: ICD-10-CM

## 2021-07-09 DIAGNOSIS — Z30.2 ENCOUNTER FOR STERILIZATION: Primary | ICD-10-CM

## 2021-07-09 PROCEDURE — 55250 REMOVAL OF SPERM DUCT(S): CPT | Performed by: UROLOGY

## 2021-07-09 PROCEDURE — 3079F DIAST BP 80-89 MM HG: CPT | Performed by: UROLOGY

## 2021-07-09 PROCEDURE — 3074F SYST BP LT 130 MM HG: CPT | Performed by: UROLOGY

## 2021-07-09 NOTE — PROCEDURES
UROLOGY PROCEDURE NOTE  NO-SCALPEL BILATERAL VASECTOMY      PREOPERATIVE DIAGNOSIS: Desires voluntary sterilization - bilateral vasectomy. POSTOPERATIVE DIAGNOSIS: Desires voluntary sterilization - bilateral vasectomy.     PROCEDURE PERFORMED: Voluntary segment of vas deferens was sent separately in formalin to pathology for identification. The patient tolerated the procedure well. Post-procedural instructions provided verbally and in written form.      Paul Fragoso MD  07/09/21

## 2021-08-16 RX ORDER — OMEPRAZOLE 40 MG/1
CAPSULE, DELAYED RELEASE ORAL
Qty: 90 CAPSULE | Refills: 0 | Status: SHIPPED | OUTPATIENT
Start: 2021-08-16 | End: 2021-11-10

## 2021-08-23 ENCOUNTER — LAB ENCOUNTER (OUTPATIENT)
Dept: LAB | Facility: HOSPITAL | Age: 41
End: 2021-08-23
Attending: NURSE PRACTITIONER
Payer: COMMERCIAL

## 2021-08-23 ENCOUNTER — OFFICE VISIT (OUTPATIENT)
Dept: SURGERY | Facility: CLINIC | Age: 41
End: 2021-08-23
Payer: COMMERCIAL

## 2021-08-23 VITALS — DIASTOLIC BLOOD PRESSURE: 86 MMHG | HEART RATE: 72 BPM | SYSTOLIC BLOOD PRESSURE: 137 MMHG

## 2021-08-23 DIAGNOSIS — Z98.52 S/P VASECTOMY: ICD-10-CM

## 2021-08-23 DIAGNOSIS — Z98.52 S/P VASECTOMY: Primary | ICD-10-CM

## 2021-08-23 LAB
SPERM IMMOTILE # SMN: ABNORMAL SPERM/ML
SPERM MOTILE # SMN: 0 SPERM/ML
SPERM P VAS # SMN: ABNORMAL SPERM/ML (ref ?–0)

## 2021-08-23 PROCEDURE — 89321 SEMEN ANAL SPERM DETECTION: CPT

## 2021-08-23 PROCEDURE — 99024 POSTOP FOLLOW-UP VISIT: CPT | Performed by: NURSE PRACTITIONER

## 2021-08-23 PROCEDURE — 3079F DIAST BP 80-89 MM HG: CPT | Performed by: NURSE PRACTITIONER

## 2021-08-23 PROCEDURE — 3075F SYST BP GE 130 - 139MM HG: CPT | Performed by: NURSE PRACTITIONER

## 2021-08-23 NOTE — PROGRESS NOTES
HPI/Subjective:     Patient ID: Michael Santos is a 38year old male. HPI      Patient is a 38year old male who presents to the clinic for a follow up.     Patient is s/p voluntary sterilization- bilateral vasectomy on 7/9/2021 with Dr. Yessy Huddleston Palpations: Abdomen is soft. Genitourinary:     Comments: Scrotal incision appears to have healed appropriately. No discharge or erythema. Musculoskeletal:         General: Normal range of motion. Skin:     General: Skin is warm and dry.    Neurologic

## 2021-10-15 ENCOUNTER — LAB ENCOUNTER (OUTPATIENT)
Dept: LAB | Facility: HOSPITAL | Age: 41
End: 2021-10-15
Attending: NURSE PRACTITIONER
Payer: COMMERCIAL

## 2021-10-15 DIAGNOSIS — Z98.52 S/P VASECTOMY: ICD-10-CM

## 2021-10-15 PROCEDURE — 89321 SEMEN ANAL SPERM DETECTION: CPT

## 2021-11-10 RX ORDER — OMEPRAZOLE 40 MG/1
CAPSULE, DELAYED RELEASE ORAL
Qty: 90 CAPSULE | Refills: 0 | Status: SHIPPED | OUTPATIENT
Start: 2021-11-10

## 2021-11-17 ENCOUNTER — HOSPITAL ENCOUNTER (OUTPATIENT)
Age: 41
Discharge: HOME | End: 2021-11-17
Payer: COMMERCIAL

## 2021-11-17 VITALS
BODY MASS INDEX: 27 KG/M2 | HEART RATE: 68 BPM | WEIGHT: 171.94 LBS | RESPIRATION RATE: 14 BRPM | TEMPERATURE: 98 F | SYSTOLIC BLOOD PRESSURE: 137 MMHG | DIASTOLIC BLOOD PRESSURE: 93 MMHG | OXYGEN SATURATION: 97 %

## 2021-11-17 DIAGNOSIS — J06.9 VIRAL URI: Primary | ICD-10-CM

## 2021-11-17 PROCEDURE — 99202 OFFICE O/P NEW SF 15 MIN: CPT

## 2021-11-17 PROCEDURE — 87880 STREP A ASSAY W/OPTIC: CPT

## 2021-11-17 PROCEDURE — 99203 OFFICE O/P NEW LOW 30 MIN: CPT

## 2021-11-17 NOTE — ED PROVIDER NOTES
Patient Seen in: [facility]      History   Patient presents with:  Sore Throat    Stated Complaint: sorethroat and runny nose    Subjective:   HPI    CHIEF COMPLAINT: Sore throat and runny     HISTORY OF PRESENT ILLNESS: Patient is a plea Drug use: No             Review of Systems    Positive for stated complaint: sorethroat and runny nose  Other systems are as noted in HPI. Constitutional and vital signs reviewed. All other systems reviewed and negative except as noted above.     Navi worsening symptoms return for reevaluation. He voiced understanding to the treatment plan. All questions answered.                              Disposition and Plan     Clinical Impression:  Viral URI  (primary encounter diagnosis)     Disposition:  Disch

## 2021-11-30 ENCOUNTER — HOSPITAL ENCOUNTER (OUTPATIENT)
Age: 41
Discharge: HOME | End: 2021-11-30
Attending: EMERGENCY MEDICINE
Payer: COMMERCIAL

## 2021-11-30 VITALS
OXYGEN SATURATION: 100 % | SYSTOLIC BLOOD PRESSURE: 134 MMHG | DIASTOLIC BLOOD PRESSURE: 78 MMHG | TEMPERATURE: 98 F | HEART RATE: 89 BPM | RESPIRATION RATE: 20 BRPM

## 2021-11-30 DIAGNOSIS — J02.0 STREP PHARYNGITIS: Primary | ICD-10-CM

## 2021-11-30 PROCEDURE — 99213 OFFICE O/P EST LOW 20 MIN: CPT

## 2021-11-30 PROCEDURE — 87880 STREP A ASSAY W/OPTIC: CPT

## 2021-11-30 PROCEDURE — 99203 OFFICE O/P NEW LOW 30 MIN: CPT

## 2021-11-30 RX ORDER — AMOXICILLIN 500 MG/1
1000 TABLET, FILM COATED ORAL DAILY
Qty: 20 TABLET | Refills: 0 | Status: SHIPPED | OUTPATIENT
Start: 2021-11-30 | End: 2021-12-10

## 2021-12-01 NOTE — ED PROVIDER NOTES
Patient Seen in: [facility]      History   Patient presents with:  Cough/URI    Stated Complaint: Sore throat, cough, chest, congestion, runny nose    Subjective:   HPI    13-year-old male presents for evaluation of cough and congestion and s oropharyngeal erythema present. No oropharyngeal exudate. Eyes:      Conjunctiva/sclera: Conjunctivae normal.   Cardiovascular:      Rate and Rhythm: Normal rate and regular rhythm. Heart sounds: Normal heart sounds.    Pulmonary:      Effort: Pulmon

## 2021-12-01 NOTE — ED INITIAL ASSESSMENT (HPI)
Cough, congestion, sore throat for over 2 weeks, tactile fever, was seen on 11/17 , not getting better

## 2022-01-14 ENCOUNTER — TELEMEDICINE (OUTPATIENT)
Dept: FAMILY MEDICINE CLINIC | Facility: CLINIC | Age: 42
End: 2022-01-14
Payer: COMMERCIAL

## 2022-01-14 DIAGNOSIS — E78.00 PURE HYPERCHOLESTEROLEMIA: Primary | ICD-10-CM

## 2022-01-14 DIAGNOSIS — R73.01 IMPAIRED FASTING GLUCOSE: ICD-10-CM

## 2022-01-15 ENCOUNTER — LAB ENCOUNTER (OUTPATIENT)
Dept: LAB | Age: 42
End: 2022-01-15
Attending: FAMILY MEDICINE
Payer: COMMERCIAL

## 2022-01-15 DIAGNOSIS — R73.01 IMPAIRED FASTING GLUCOSE: Primary | ICD-10-CM

## 2022-01-15 LAB
ALBUMIN SERPL-MCNC: 4 G/DL (ref 3.4–5)
ALBUMIN/GLOB SERPL: 1.1 {RATIO} (ref 1–2)
ALP LIVER SERPL-CCNC: 80 U/L
ALT SERPL-CCNC: 44 U/L
ANION GAP SERPL CALC-SCNC: 4 MMOL/L (ref 0–18)
AST SERPL-CCNC: 19 U/L (ref 15–37)
BILIRUB SERPL-MCNC: 0.6 MG/DL (ref 0.1–2)
BUN BLD-MCNC: 15 MG/DL (ref 7–18)
BUN/CREAT SERPL: 14.6 (ref 10–20)
CALCIUM BLD-MCNC: 9.2 MG/DL (ref 8.5–10.1)
CHLORIDE SERPL-SCNC: 105 MMOL/L (ref 98–112)
CHOLEST SERPL-MCNC: 216 MG/DL (ref ?–200)
CO2 SERPL-SCNC: 31 MMOL/L (ref 21–32)
CREAT BLD-MCNC: 1.03 MG/DL
EST. AVERAGE GLUCOSE BLD GHB EST-MCNC: 120 MG/DL (ref 68–126)
FASTING PATIENT LIPID ANSWER: YES
FASTING STATUS PATIENT QL REPORTED: YES
GLOBULIN PLAS-MCNC: 3.6 G/DL (ref 2.8–4.4)
GLUCOSE BLD-MCNC: 104 MG/DL (ref 70–99)
HBA1C MFR BLD: 5.8 % (ref ?–5.7)
HDLC SERPL-MCNC: 46 MG/DL (ref 40–59)
LDLC SERPL CALC-MCNC: 144 MG/DL (ref ?–100)
NONHDLC SERPL-MCNC: 170 MG/DL (ref ?–130)
OSMOLALITY SERPL CALC.SUM OF ELEC: 291 MOSM/KG (ref 275–295)
POTASSIUM SERPL-SCNC: 4 MMOL/L (ref 3.5–5.1)
PROT SERPL-MCNC: 7.6 G/DL (ref 6.4–8.2)
SODIUM SERPL-SCNC: 140 MMOL/L (ref 136–145)
TRIGL SERPL-MCNC: 147 MG/DL (ref 30–149)
TSI SER-ACNC: 0.94 MIU/ML (ref 0.36–3.74)
VLDLC SERPL CALC-MCNC: 28 MG/DL (ref 0–30)

## 2022-01-15 PROCEDURE — 80061 LIPID PANEL: CPT

## 2022-01-15 PROCEDURE — 84443 ASSAY THYROID STIM HORMONE: CPT | Performed by: FAMILY MEDICINE

## 2022-01-15 PROCEDURE — 83036 HEMOGLOBIN GLYCOSYLATED A1C: CPT | Performed by: FAMILY MEDICINE

## 2022-01-15 PROCEDURE — 80053 COMPREHEN METABOLIC PANEL: CPT | Performed by: FAMILY MEDICINE

## 2022-01-15 PROCEDURE — 36415 COLL VENOUS BLD VENIPUNCTURE: CPT | Performed by: FAMILY MEDICINE

## 2022-02-21 RX ORDER — OMEPRAZOLE 40 MG/1
CAPSULE, DELAYED RELEASE ORAL
Qty: 90 CAPSULE | Refills: 0 | Status: SHIPPED | OUTPATIENT
Start: 2022-02-21

## 2022-03-20 ENCOUNTER — HOSPITAL ENCOUNTER (OUTPATIENT)
Age: 42
Discharge: HOME | End: 2022-03-20
Attending: EMERGENCY MEDICINE
Payer: COMMERCIAL

## 2022-03-20 VITALS
DIASTOLIC BLOOD PRESSURE: 84 MMHG | OXYGEN SATURATION: 100 % | RESPIRATION RATE: 18 BRPM | SYSTOLIC BLOOD PRESSURE: 130 MMHG | HEART RATE: 78 BPM | TEMPERATURE: 99 F

## 2022-03-20 DIAGNOSIS — L03.012 PARONYCHIA OF LEFT THUMB: Primary | ICD-10-CM

## 2022-03-20 PROCEDURE — 99213 OFFICE O/P EST LOW 20 MIN: CPT

## 2022-03-20 PROCEDURE — 10060 I&D ABSCESS SIMPLE/SINGLE: CPT

## 2022-03-20 RX ORDER — CEPHALEXIN 500 MG/1
500 CAPSULE ORAL 3 TIMES DAILY
Qty: 15 CAPSULE | Refills: 0 | Status: SHIPPED | OUTPATIENT
Start: 2022-03-20 | End: 2022-03-25

## 2022-03-20 NOTE — ED INITIAL ASSESSMENT (HPI)
Presents with pain and swelling to left thumb at cuticle. States he removed a wood splinter from area about 1 week ago. Area red and tender to touch. No fever.

## 2022-05-02 RX ORDER — OMEPRAZOLE 40 MG/1
CAPSULE, DELAYED RELEASE ORAL
Qty: 30 CAPSULE | Refills: 2 | Status: SHIPPED | OUTPATIENT
Start: 2022-05-02

## 2022-11-22 ENCOUNTER — HOSPITAL ENCOUNTER (OUTPATIENT)
Age: 42
Discharge: HOME | End: 2022-11-22
Attending: EMERGENCY MEDICINE
Payer: COMMERCIAL

## 2022-11-22 VITALS
SYSTOLIC BLOOD PRESSURE: 128 MMHG | HEART RATE: 98 BPM | WEIGHT: 157.88 LBS | TEMPERATURE: 99 F | DIASTOLIC BLOOD PRESSURE: 79 MMHG | RESPIRATION RATE: 18 BRPM | BODY MASS INDEX: 25 KG/M2 | OXYGEN SATURATION: 97 %

## 2022-11-22 DIAGNOSIS — J10.1 INFLUENZA A: Primary | ICD-10-CM

## 2022-11-22 LAB
POCT INFLUENZA A: POSITIVE
POCT INFLUENZA B: NEGATIVE
S PYO AG THROAT QL: NEGATIVE
SARS-COV-2 RNA RESP QL NAA+PROBE: NOT DETECTED

## 2022-11-22 PROCEDURE — 99213 OFFICE O/P EST LOW 20 MIN: CPT

## 2022-11-22 PROCEDURE — 99212 OFFICE O/P EST SF 10 MIN: CPT

## 2022-11-22 PROCEDURE — 87880 STREP A ASSAY W/OPTIC: CPT

## 2022-11-22 PROCEDURE — 87502 INFLUENZA DNA AMP PROBE: CPT | Performed by: EMERGENCY MEDICINE

## 2023-04-18 ENCOUNTER — OFFICE VISIT (OUTPATIENT)
Dept: FAMILY MEDICINE CLINIC | Facility: CLINIC | Age: 43
End: 2023-04-18
Payer: COMMERCIAL

## 2023-04-18 VITALS
SYSTOLIC BLOOD PRESSURE: 112 MMHG | HEIGHT: 66 IN | RESPIRATION RATE: 16 BRPM | TEMPERATURE: 99 F | DIASTOLIC BLOOD PRESSURE: 82 MMHG | OXYGEN SATURATION: 99 % | WEIGHT: 160 LBS | BODY MASS INDEX: 25.71 KG/M2 | HEART RATE: 85 BPM

## 2023-04-18 DIAGNOSIS — J02.9 SORE THROAT: ICD-10-CM

## 2023-04-18 DIAGNOSIS — J02.0 STREP PHARYNGITIS: Primary | ICD-10-CM

## 2023-04-18 LAB
CONTROL LINE PRESENT WITH A CLEAR BACKGROUND (YES/NO): YES YES/NO
KIT LOT #: ABNORMAL NUMERIC
STREP GRP A CUL-SCR: POSITIVE

## 2023-04-18 PROCEDURE — 3008F BODY MASS INDEX DOCD: CPT

## 2023-04-18 PROCEDURE — 3074F SYST BP LT 130 MM HG: CPT

## 2023-04-18 PROCEDURE — 87880 STREP A ASSAY W/OPTIC: CPT

## 2023-04-18 PROCEDURE — 3079F DIAST BP 80-89 MM HG: CPT

## 2023-04-18 PROCEDURE — 99202 OFFICE O/P NEW SF 15 MIN: CPT

## 2023-04-18 RX ORDER — AMOXICILLIN 875 MG/1
875 TABLET, COATED ORAL 2 TIMES DAILY
Qty: 20 TABLET | Refills: 0 | Status: SHIPPED | OUTPATIENT
Start: 2023-04-18 | End: 2023-04-28

## 2023-04-18 RX ORDER — AMOXICILLIN 875 MG/1
875 TABLET, COATED ORAL 2 TIMES DAILY
Qty: 20 TABLET | Refills: 0 | Status: SHIPPED | OUTPATIENT
Start: 2023-04-18 | End: 2023-04-18 | Stop reason: CLARIF

## 2023-08-07 ENCOUNTER — OFFICE VISIT (OUTPATIENT)
Dept: FAMILY MEDICINE CLINIC | Facility: CLINIC | Age: 43
End: 2023-08-07

## 2023-08-07 VITALS
DIASTOLIC BLOOD PRESSURE: 80 MMHG | SYSTOLIC BLOOD PRESSURE: 124 MMHG | WEIGHT: 162 LBS | HEART RATE: 66 BPM | BODY MASS INDEX: 26.03 KG/M2 | HEIGHT: 66 IN

## 2023-08-07 DIAGNOSIS — E78.00 PURE HYPERCHOLESTEROLEMIA: ICD-10-CM

## 2023-08-07 DIAGNOSIS — R73.01 IMPAIRED FASTING GLUCOSE: Primary | ICD-10-CM

## 2023-08-07 DIAGNOSIS — R68.89 COLD INTOLERANCE: ICD-10-CM

## 2023-08-07 PROCEDURE — 3074F SYST BP LT 130 MM HG: CPT | Performed by: FAMILY MEDICINE

## 2023-08-07 PROCEDURE — 3008F BODY MASS INDEX DOCD: CPT | Performed by: FAMILY MEDICINE

## 2023-08-07 PROCEDURE — 3079F DIAST BP 80-89 MM HG: CPT | Performed by: FAMILY MEDICINE

## 2023-08-07 PROCEDURE — 99213 OFFICE O/P EST LOW 20 MIN: CPT | Performed by: FAMILY MEDICINE

## 2023-08-07 RX ORDER — NICOTINE POLACRILEX 4 MG/1
90 GUM, CHEWING ORAL DAILY
Qty: 90 TABLET | Refills: 3 | Status: SHIPPED | OUTPATIENT
Start: 2023-08-07 | End: 2023-09-06

## 2023-08-07 NOTE — PROGRESS NOTES
Blood pressure 124/80, pulse 66, height 5' 6\" (1.676 m), weight 162 lb (73.5 kg). Patient presents today following up for heartburn and concerned about cholesterol and blood sugar. Denies chest pain or dyspnea no family history of heart disease or stroke he has never been a smoker. Continues to play soccer. No known family history of prostate cancer. Objective patient comfortable no apparent distress    Assessment #1 hyperlipidemia #2 impaired fasting glucose #3 GERD    Plan blood tests ordered #2 blood test ordered #3 omeprazole over-the-counter patient prefers to buy over-the-counter.

## 2023-08-10 LAB
ABSOLUTE BASOPHILS: 47 CELLS/UL (ref 0–200)
ABSOLUTE EOSINOPHILS: 118 CELLS/UL (ref 15–500)
ABSOLUTE LYMPHOCYTES: 2183 CELLS/UL (ref 850–3900)
ABSOLUTE MONOCYTES: 590 CELLS/UL (ref 200–950)
ABSOLUTE NEUTROPHILS: 2962 CELLS/UL (ref 1500–7800)
ALBUMIN/GLOBULIN RATIO: 1.5 (CALC) (ref 1–2.5)
ALBUMIN: 4.7 G/DL (ref 3.6–5.1)
ALKALINE PHOSPHATASE: 75 U/L (ref 36–130)
ALT: 24 U/L (ref 9–46)
AST: 19 U/L (ref 10–40)
BASOPHILS: 0.8 %
BILIRUBIN, TOTAL: 0.8 MG/DL (ref 0.2–1.2)
BUN: 17 MG/DL (ref 7–25)
CALCIUM: 10.1 MG/DL (ref 8.6–10.3)
CARBON DIOXIDE: 33 MMOL/L (ref 20–32)
CHLORIDE: 102 MMOL/L (ref 98–110)
CHOL/HDLC RATIO: 4.5 (CALC)
CHOLESTEROL, TOTAL: 236 MG/DL
CREATININE: 1.01 MG/DL (ref 0.6–1.29)
EGFR: 95 ML/MIN/1.73M2
EOSINOPHILS: 2 %
GLOBULIN: 3.1 G/DL (CALC) (ref 1.9–3.7)
GLUCOSE: 99 MG/DL (ref 65–99)
HDL CHOLESTEROL: 52 MG/DL
HEMATOCRIT: 42.9 % (ref 38.5–50)
HEMOGLOBIN A1C: 5.6 % OF TOTAL HGB
HEMOGLOBIN: 13.8 G/DL (ref 13.2–17.1)
LDL-CHOLESTEROL: 146 MG/DL (CALC)
LYMPHOCYTES: 37 %
MCH: 28.2 PG (ref 27–33)
MCHC: 32.2 G/DL (ref 32–36)
MCV: 87.6 FL (ref 80–100)
MONOCYTES: 10 %
MPV: 10.5 FL (ref 7.5–12.5)
NEUTROPHILS: 50.2 %
NON-HDL CHOLESTEROL: 184 MG/DL (CALC)
PLATELET COUNT: 244 THOUSAND/UL (ref 140–400)
POTASSIUM: 4.6 MMOL/L (ref 3.5–5.3)
PROTEIN, TOTAL: 7.8 G/DL (ref 6.1–8.1)
RDW: 14.5 % (ref 11–15)
RED BLOOD CELL COUNT: 4.9 MILLION/UL (ref 4.2–5.8)
SODIUM: 140 MMOL/L (ref 135–146)
TRIGLYCERIDES: 236 MG/DL
TSH W/REFLEX TO FT4: 1.26 MIU/L (ref 0.4–4.5)
WHITE BLOOD CELL COUNT: 5.9 THOUSAND/UL (ref 3.8–10.8)

## 2023-10-26 ENCOUNTER — OFFICE VISIT (OUTPATIENT)
Dept: FAMILY MEDICINE CLINIC | Facility: CLINIC | Age: 43
End: 2023-10-26

## 2023-10-26 VITALS
RESPIRATION RATE: 16 BRPM | TEMPERATURE: 98 F | WEIGHT: 164 LBS | HEART RATE: 73 BPM | OXYGEN SATURATION: 100 % | BODY MASS INDEX: 26.36 KG/M2 | SYSTOLIC BLOOD PRESSURE: 120 MMHG | HEIGHT: 66 IN | DIASTOLIC BLOOD PRESSURE: 74 MMHG

## 2023-10-26 DIAGNOSIS — J02.0 STREP PHARYNGITIS: Primary | ICD-10-CM

## 2023-10-26 DIAGNOSIS — J02.9 SORE THROAT: ICD-10-CM

## 2023-10-26 PROCEDURE — 99213 OFFICE O/P EST LOW 20 MIN: CPT

## 2023-10-26 PROCEDURE — 3078F DIAST BP <80 MM HG: CPT

## 2023-10-26 PROCEDURE — 3074F SYST BP LT 130 MM HG: CPT

## 2023-10-26 PROCEDURE — 3008F BODY MASS INDEX DOCD: CPT

## 2023-10-26 PROCEDURE — 87880 STREP A ASSAY W/OPTIC: CPT

## 2023-10-26 RX ORDER — AMOXICILLIN 875 MG/1
875 TABLET, COATED ORAL 2 TIMES DAILY
Qty: 20 TABLET | Refills: 0 | Status: SHIPPED | OUTPATIENT
Start: 2023-10-26 | End: 2023-11-05

## 2023-10-27 ENCOUNTER — OFFICE VISIT (OUTPATIENT)
Dept: OTOLARYNGOLOGY | Facility: CLINIC | Age: 43
End: 2023-10-27

## 2023-10-27 VITALS — WEIGHT: 160 LBS | HEIGHT: 66 IN | BODY MASS INDEX: 25.71 KG/M2

## 2023-10-27 DIAGNOSIS — G47.33 OSA (OBSTRUCTIVE SLEEP APNEA): Primary | ICD-10-CM

## 2023-10-27 PROCEDURE — 99213 OFFICE O/P EST LOW 20 MIN: CPT | Performed by: OTOLARYNGOLOGY

## 2023-10-27 PROCEDURE — 3008F BODY MASS INDEX DOCD: CPT | Performed by: OTOLARYNGOLOGY

## 2023-12-26 ENCOUNTER — OFFICE VISIT (OUTPATIENT)
Dept: SLEEP CENTER | Age: 43
End: 2023-12-26
Attending: OTOLARYNGOLOGY
Payer: COMMERCIAL

## 2023-12-26 DIAGNOSIS — G47.33 OSA (OBSTRUCTIVE SLEEP APNEA): ICD-10-CM

## 2023-12-26 PROCEDURE — 95806 SLEEP STUDY UNATT&RESP EFFT: CPT

## 2023-12-28 NOTE — PROCEDURES
[facility]  Accredited by the [employer] ([employer])    PATIENT'S NAME: Pedro Gonzales LUIS ALBERTO   ATTENDING PHYSICIAN: Mike Marcos. Gagan Barth DO   REFERRING PHYSICIAN: Carolyn Morales MD   PATIENT ACCOUNT #: [de-identified] LOCATION: [address] [place] [address] RECORD #: [id number] YOB: 1980   DATE OF STUDY: 12/26/2023       SLEEP STUDY REPORT    STUDY TYPE:  Home sleep test.      INDICATION:  Suspected obstructive sleep apnea (ICD-10 code G47.33) in patient with nocturnal awakening with gasping, snoring, excessive daytime somnolence, septal deviation, reflux, COVID-19 and a body mass index of 25.7. RESULTS:  The patient underwent home sleep test with measurement of his nasal air flow, nasal air pressure, snoring, chest and abdominal wall motion, oximetry, and body position. I have reviewed the entirety of the raw data of this study. During this study, total recording time was 201 minutes. The lights-out clock time is 1:42 a.m., lights-on clock time is 5:00 a.m. The apnea plus hypopnea index is 7.1 events per hour, and the supine apnea plus hypopnea index is 42.4 events per hour. The average oxygen saturation is 96%, the lowest oxygen saturation is 92%, and the patient spent 5% of the testing with saturations 88% or less. The average heart rate is 61 beats per minute, and the patient spent approximately 5% of the test in the supine position. INTERPRETATION:  The data generated from this study is consistent with mild obstructive sleep apnea which becomes severe in the supine position (ICD-10 code G47.33). RECOMMENDATIONS:    1. Would proceed to CPAP titration in this symptomatic patient. 2.   Avoid alcohol. 3.   Avoid sedating drug. 4.   Patient should not drive if at all sleepy. Please do not hesitate to contact me if there is any question whatsoever regarding interpretation of this study.     Dictated By Isaías Vergara MD  d: 12/28/2023 08:47:29  t: 12/28/2023 10:18:02  [facility] [id number]  [facility]/    cc: Carla Oakley. DO Rikki Rendon MD

## 2023-12-29 ENCOUNTER — OFFICE VISIT (OUTPATIENT)
Dept: FAMILY MEDICINE CLINIC | Facility: CLINIC | Age: 43
End: 2023-12-29

## 2023-12-29 ENCOUNTER — TELEPHONE (OUTPATIENT)
Dept: OTOLARYNGOLOGY | Facility: CLINIC | Age: 43
End: 2023-12-29

## 2023-12-29 ENCOUNTER — TELEPHONE (OUTPATIENT)
Dept: FAMILY MEDICINE CLINIC | Facility: CLINIC | Age: 43
End: 2023-12-29

## 2023-12-29 VITALS
DIASTOLIC BLOOD PRESSURE: 82 MMHG | SYSTOLIC BLOOD PRESSURE: 120 MMHG | WEIGHT: 162 LBS | HEART RATE: 65 BPM | BODY MASS INDEX: 26.03 KG/M2 | HEIGHT: 66 IN

## 2023-12-29 DIAGNOSIS — K21.9 GASTROESOPHAGEAL REFLUX DISEASE, UNSPECIFIED WHETHER ESOPHAGITIS PRESENT: Primary | ICD-10-CM

## 2023-12-29 PROCEDURE — 3008F BODY MASS INDEX DOCD: CPT | Performed by: FAMILY MEDICINE

## 2023-12-29 PROCEDURE — 99213 OFFICE O/P EST LOW 20 MIN: CPT | Performed by: FAMILY MEDICINE

## 2023-12-29 PROCEDURE — 3079F DIAST BP 80-89 MM HG: CPT | Performed by: FAMILY MEDICINE

## 2023-12-29 PROCEDURE — 3074F SYST BP LT 130 MM HG: CPT | Performed by: FAMILY MEDICINE

## 2023-12-29 NOTE — TELEPHONE ENCOUNTER
Left message to return call, see below. ----- Message from Lashon Foss DO sent at 12/28/2023  3:34 PM CST -----  Sleep apnea seen on sleep study. Patient to follow-up with Dr. Glo Jung.

## 2023-12-29 NOTE — PROGRESS NOTES
Blood pressure 120/82, pulse 65, height 5' 6\" (1.676 m), weight 162 lb (73.5 kg). Chronic heartburn has been taking omeprazole daily for more than 5 years. Does not smoke. Drinks alcohol on the weekends only. Also reports that when he eats spicy foods worse. Patient also reports that he has frequent itching with occasional bleeding from his rectum. He reports he has had it for many years but has never said anything until now.       Objective rectal exam with healing anal fissure noted at 6:00    Assessment #1 GERD #2 fissures    Plan #1 referral to gastroenterology also omeprazole held lifestyle changes advised #2 increase water and fiber English information given in Angolan    Follow-up blood testing in 6 months

## 2024-01-02 NOTE — TELEPHONE ENCOUNTER
Patient would like sleep study results. Per Dr. Rendon sleep apnea on sleep study. Doctor please review.

## 2024-01-26 ENCOUNTER — OFFICE VISIT (OUTPATIENT)
Dept: OTOLARYNGOLOGY | Facility: CLINIC | Age: 44
End: 2024-01-26
Payer: COMMERCIAL

## 2024-01-26 ENCOUNTER — PATIENT MESSAGE (OUTPATIENT)
Dept: OTOLARYNGOLOGY | Facility: CLINIC | Age: 44
End: 2024-01-26

## 2024-01-26 VITALS — WEIGHT: 162 LBS | HEIGHT: 66 IN | BODY MASS INDEX: 26.03 KG/M2

## 2024-01-26 DIAGNOSIS — G47.33 OSA (OBSTRUCTIVE SLEEP APNEA): Primary | ICD-10-CM

## 2024-01-26 PROCEDURE — 3008F BODY MASS INDEX DOCD: CPT | Performed by: OTOLARYNGOLOGY

## 2024-01-26 PROCEDURE — 99214 OFFICE O/P EST MOD 30 MIN: CPT | Performed by: OTOLARYNGOLOGY

## 2024-01-26 NOTE — PROGRESS NOTES
Renaldo Pablo is a 43 year old male.    Chief Complaint   Patient presents with    Results     Discuss sleep study results        HISTORY OF PRESENT ILLNESS  He presents with a history of feeling congested in his nose and having difficulty breathing through his nose.  States that his wife points out that he snores aerobically and he even wakes himself up gasping for air at times.  He does relate a history of a previous sleep study performed many years ago.  He is not sure of the results but states that he was never offered a CPAP machine.  Most recently tested COVID positive in May.  No further symptoms at this time.  Allergies?  Currently on no medications.  He does relate a history of reflux currently on 20 mg of omeprazole daily.  He does complain of some throat discomfort.     8/22/2020 last visit started on 40 mg of omeprazole Singulair loratadine and the nasal spray.  No real help from the use of Astelin or allergy meds but does feel that the omeprazole has been much stopped all of his symptoms of choking when he sleeping.  He feels that this is primarily due to reflux occurring while he is asleep.  Does not complain of any significant daytime issues and feels that his previously noted sleep apnea is minimal we were not able to get copies of his sleep study yet.     5/7/21 doing very well complete resolution of his nasal issues at this time.  Doing quite well with omeprazole 40 mg with no choking or chest sensations.  He did note that when he backed down from 40 mg to 20 mg that his symptoms in his chest returned and when he stopped the omeprazole completely he has significant lower GI issues.  Wishes to discuss long-term management of this problem.  He has had a previous EGD over 3 years ago and states that no hiatal hernia was noted at that time.     10/27/23 he presents today with complaints that his wife is bothered by how loud his snoring is getting.  He does state that she has pointed out to  him that he appears to choke and gasp for air throughout the night.  He does admit to daytime somnolence and feels like his sleep is not as restful as it once used to be.  He does admit to some nasal obstructive issues.  Known septal deviation.  No other signs, symptoms or complaints     1/26/24 he is here to go over the results of his sleep study.  Sleep study demonstrates mild obstructive sleep apnea when he sleeping on his side but severe on his back.  Here to discuss further management.  He does find that he sleeps all over the place but does spend the good proportion of time on his back.  No other signs, symptoms or complaints.  Previously noted to have a deviated septum.      Social History     Socioeconomic History    Marital status:    Tobacco Use    Smoking status: Never    Smokeless tobacco: Never   Vaping Use    Vaping Use: Never used   Substance and Sexual Activity    Alcohol use: Yes     Alcohol/week: 11.0 standard drinks of alcohol     Types: 11 Cans of beer per week     Comment: beer - occasionally    Drug use: No   Other Topics Concern    Caffeine Concern No       Family History   Problem Relation Age of Onset    Cancer Other         family h/o       Past Medical History:   Diagnosis Date    Acid reflux     Esophageal reflux     Hyperlipidemia 05/03/2016    Lipid screening 01/02/2009       Past Surgical History:   Procedure Laterality Date    ARTHROSCOPY OF JOINT UNLISTED      KNEE SURGERY Left 2005    Arthroscopy    VASECTOMY Bilateral 07/09/2021    Dr. Clark         REVIEW OF SYSTEMS    System Neg/Pos Details   Constitutional Negative Fatigue, fever and weight loss.   ENMT Negative Drooling.   Eyes Negative Blurred vision and vision changes.   Respiratory Negative Dyspnea and wheezing.   Cardio Negative Chest pain, irregular heartbeat/palpitations and syncope.   GI Negative Abdominal pain and diarrhea.   Endocrine Negative Cold intolerance and heat intolerance.   Neuro Negative Tremors.    Psych Negative Anxiety and depression.   Integumentary Negative Frequent skin infections, pigment change and rash.   Hema/Lymph Negative Easy bleeding and easy bruising.           PHYSICAL EXAM    Ht 5' 6\" (1.676 m)   Wt 162 lb (73.5 kg)   BMI 26.15 kg/m²        Constitutional Normal Overall appearance - Normal.   Psychiatric Normal Orientation - Oriented to time, place, person & situation. Appropriate mood and affect.   Neck Exam Normal Inspection - Normal. Palpation - Normal. Parotid gland - Normal. Thyroid gland - Normal.   Eyes Normal Conjunctiva - Right: Normal, Left: Normal. Pupil - Right: Normal, Left: Normal. Fundus - Right: Normal, Left: Normal.   Neurological Normal Memory - Normal. Cranial nerves - Cranial nerves II through XII grossly intact.   Head/Face Normal Facial features - Normal. Eyebrows - Normal. Skull - Normal.        Nasopharynx Normal External nose - Normal. Lips/teeth/gums - Normal. Tonsils - Normal. Oropharynx - Normal.   Ears Normal Inspection - Right: Normal, Left: Normal. Canal - Right: Normal, Left: Normal. TM - Right: Normal, Left: Normal.   Skin Normal Inspection - Normal.        Lymph Detail Normal Submental. Submandibular. Anterior cervical. Posterior cervical. Supraclavicular.        Nose/Mouth/Throat Normal External nose - Normal. Lips/teeth/gums - Normal. Tonsils - Normal. Oropharynx - Normal.  Somewhat obstructed oropharyngeal airway by his tongue.   Nose/Mouth/Throat Normal Nares - Right: Normal Left: Normal. Septum -deviated turbinates - Right: Normal, Left: Normal.     No current outpatient medications on file.  ASSESSMENT AND PLAN    1. HONEY (obstructive sleep apnea)  Deviated septum on exam external nasal distortion of the dorsum as well.  I did recommend that he undergo an APAP titration at home to see if he tolerates CPAP.  Return to see me in several months after using the machinery and he does understand that if he has trouble tolerating the use of the CPAP he may  require septoplasty to allow him to tolerate the machine in the future.  We did discuss the fact that he has mild HONEY he sleeps on his side but on his back he has severe OCD therefore obstructive sleep apnea at all times.  Present 30 minutes was spent with patient reviewing his sleep apnea study and in discussions regarding appropriate management options.  - DME - External        This note was prepared using Dragon Medical voice recognition dictation software. As a result errors may occur. When identified these errors have been corrected. While every attempt is made to correct errors during dictation discrepancies may still exist    Rikki Folwer MD    1/26/2024    5:50 PM

## 2024-01-30 NOTE — TELEPHONE ENCOUNTER
From: Renaldo Pablo  To: Rikki Flower  Sent: 1/26/2024 1:53 PM CST  Subject: Reconsidering    Iveth Flower,    After thinking about my decision to proceed with the machine and discussing it with my wife, I would like to reconsider my options. Although I think the machine may help me, I did not think of my nightly routine and how it would be impacted. May I request we go ahead and further discuss a procedure to try and fix my nose instead. I understand after 10 years or so, the machine may be an inevitable option but if I can prolong the use of that machine, I think that would best fit my life at this point in time. Should I make an appt to discuss this? How do I proceed?    Thank you,    Renaldo Arroyo  [phone]

## 2024-02-16 ENCOUNTER — OFFICE VISIT (OUTPATIENT)
Dept: OTOLARYNGOLOGY | Facility: CLINIC | Age: 44
End: 2024-02-16
Payer: COMMERCIAL

## 2024-02-16 VITALS — WEIGHT: 160 LBS | HEIGHT: 67 IN | BODY MASS INDEX: 25.11 KG/M2

## 2024-02-16 DIAGNOSIS — G47.33 OSA (OBSTRUCTIVE SLEEP APNEA): Primary | ICD-10-CM

## 2024-02-16 PROCEDURE — 99214 OFFICE O/P EST MOD 30 MIN: CPT | Performed by: OTOLARYNGOLOGY

## 2024-02-16 RX ORDER — OMEPRAZOLE 20 MG/1
20 CAPSULE, DELAYED RELEASE ORAL
COMMUNITY

## 2024-02-16 NOTE — PROGRESS NOTES
Renaldo Pablo is a 43 year old male.    Chief Complaint   Patient presents with    Follow - Up     Patient is here for HONEY f/up, per pt he has not started using the machine and wants to know about other options.       HISTORY OF PRESENT ILLNESS  He presents with a history of feeling congested in his nose and having difficulty breathing through his nose.  States that his wife points out that he snores aerobically and he even wakes himself up gasping for air at times.  He does relate a history of a previous sleep study performed many years ago.  He is not sure of the results but states that he was never offered a CPAP machine.  Most recently tested COVID positive in May.  No further symptoms at this time.  Allergies?  Currently on no medications.  He does relate a history of reflux currently on 20 mg of omeprazole daily.  He does complain of some throat discomfort.     8/22/2020 last visit started on 40 mg of omeprazole Singulair loratadine and the nasal spray.  No real help from the use of Astelin or allergy meds but does feel that the omeprazole has been much stopped all of his symptoms of choking when he sleeping.  He feels that this is primarily due to reflux occurring while he is asleep.  Does not complain of any significant daytime issues and feels that his previously noted sleep apnea is minimal we were not able to get copies of his sleep study yet.     5/7/21 doing very well complete resolution of his nasal issues at this time.  Doing quite well with omeprazole 40 mg with no choking or chest sensations.  He did note that when he backed down from 40 mg to 20 mg that his symptoms in his chest returned and when he stopped the omeprazole completely he has significant lower GI issues.  Wishes to discuss long-term management of this problem.  He has had a previous EGD over 3 years ago and states that no hiatal hernia was noted at that time.     10/27/23 he presents today with complaints that his wife is  bothered by how loud his snoring is getting.  He does state that she has pointed out to him that he appears to choke and gasp for air throughout the night.  He does admit to daytime somnolence and feels like his sleep is not as restful as it once used to be.  He does admit to some nasal obstructive issues.  Known septal deviation.  No other signs, symptoms or complaints     1/26/24 he is here to go over the results of his sleep study.  Sleep study demonstrates mild obstructive sleep apnea when he sleeping on his side but severe on his back.  Here to discuss further management.  He does find that he sleeps all over the place but does spend the good proportion of time on his back.  No other signs, symptoms or complaints.  Previously noted to have a deviated septum.     2/16/23 he presents today with his wife with concerns about his use of CPAP with her current lifestyle.  They have 2 young children who typically sleep in bed with them and sleeping with the mask and hoses would make things very difficult.  In addition they travel quite a bit.  We did discuss the fact that he has mild obstructive apnea that becomes severe in the supine position.  Wife states that she typically manages him to have him turn onto his side because he does snore last and seems to obstruct last as well.  No other signs, symptoms or complaints.  She noted to have a deviated septum they have many questions regarding management of his sleep apnea and specifically wanted to know alternatives to use of a CPAP machine      Social History     Socioeconomic History    Marital status:    Tobacco Use    Smoking status: Never    Smokeless tobacco: Never   Vaping Use    Vaping Use: Never used   Substance and Sexual Activity    Alcohol use: Yes     Alcohol/week: 11.0 standard drinks of alcohol     Types: 11 Cans of beer per week     Comment: beer - occasionally    Drug use: No   Other Topics Concern    Caffeine Concern No       Family History    Problem Relation Age of Onset    Cancer Other         family h/o       Past Medical History:   Diagnosis Date    Acid reflux     Esophageal reflux     Hyperlipidemia 05/03/2016    Lipid screening 01/02/2009       Past Surgical History:   Procedure Laterality Date    ARTHROSCOPY OF JOINT UNLISTED      KNEE SURGERY Left 2005    Arthroscopy    VASECTOMY Bilateral 07/09/2021    Dr. Clark         REVIEW OF SYSTEMS    System Neg/Pos Details   Constitutional Negative Fatigue, fever and weight loss.   ENMT Negative Drooling.   Eyes Negative Blurred vision and vision changes.   Respiratory Negative Dyspnea and wheezing.   Cardio Negative Chest pain, irregular heartbeat/palpitations and syncope.   GI Negative Abdominal pain and diarrhea.   Endocrine Negative Cold intolerance and heat intolerance.   Neuro Negative Tremors.   Psych Negative Anxiety and depression.   Integumentary Negative Frequent skin infections, pigment change and rash.   Hema/Lymph Negative Easy bleeding and easy bruising.           PHYSICAL EXAM    Ht 5' 7\" (1.702 m)   Wt 160 lb (72.6 kg)   BMI 25.06 kg/m²        Constitutional Normal Overall appearance - Normal.   Psychiatric Normal Orientation - Oriented to time, place, person & situation. Appropriate mood and affect.   Neck Exam Normal Inspection - Normal. Palpation - Normal. Parotid gland - Normal. Thyroid gland - Normal.   Eyes Normal Conjunctiva - Right: Normal, Left: Normal. Pupil - Right: Normal, Left: Normal. Fundus - Right: Normal, Left: Normal.   Neurological Normal Memory - Normal. Cranial nerves - Cranial nerves II through XII grossly intact.   Head/Face Normal Facial features - Normal. Eyebrows - Normal. Skull - Normal.        Nasopharynx Normal External nose - Normal. Lips/teeth/gums - Normal. Tonsils - Normal. Oropharynx - Normal.   Ears Normal Inspection - Right: Normal, Left: Normal. Canal - Right: Normal, Left: Normal. TM - Right: Normal, Left: Normal.   Skin Normal Inspection -  Normal.        Lymph Detail Normal Submental. Submandibular. Anterior cervical. Posterior cervical. Supraclavicular.        Nose/Mouth/Throat Normal External nose - Normal. Lips/teeth/gums - Normal. Tonsils - Normal. Oropharynx - Normal.  Large tongue base obstructing the oropharyngeal airway   Nose/Mouth/Throat Normal Nares - Right: Normal Left: Normal. Septum -deviated septum turbinates - Right: Normal, Left: Normal.       Current Outpatient Medications:     omeprazole 20 MG Oral Capsule Delayed Release, Take 1 capsule (20 mg total) by mouth every morning before breakfast., Disp: , Rfl:   ASSESSMENT AND PLAN    1. HONEY (obstructive sleep apnea)  Mild obstructive sleep apnea that becomes severe in supine position.  He and his wife are both present and they have concerns about the use of CPAP in the setting of having kids to sleep in bed with him at this time and just a lot of personal things that would make using CPAP very cumbersome.  We did discuss the fact that he lays on his side he simply has mild sleep apnea and that he may benefit from either a surgical procedure to address his septum which is deviated or nonsurgical approach using an oral appliance to advance his jaw forward and open his airway.  I did recommend a nonsurgical approach for now and he will return to see me once he looks into that further.  I did recommend that he meet with a dentist to get a referral to a sleep dentist.  Greater than 30 minutes spent with patient and wife in discussions regarding management of his sleep apnea        This note was prepared using Dragon Medical voice recognition dictation software. As a result errors may occur. When identified these errors have been corrected. While every attempt is made to correct errors during dictation discrepancies may still exist    Rikki Flower MD    2/16/2024    5:10 PM

## 2024-04-20 ENCOUNTER — APPOINTMENT (OUTPATIENT)
Dept: GENERAL RADIOLOGY | Facility: HOSPITAL | Age: 44
End: 2024-04-20
Attending: EMERGENCY MEDICINE
Payer: COMMERCIAL

## 2024-04-20 ENCOUNTER — HOSPITAL ENCOUNTER (EMERGENCY)
Facility: HOSPITAL | Age: 44
Discharge: HOME | End: 2024-04-20
Attending: EMERGENCY MEDICINE
Payer: COMMERCIAL

## 2024-04-20 VITALS
RESPIRATION RATE: 14 BRPM | OXYGEN SATURATION: 99 % | DIASTOLIC BLOOD PRESSURE: 84 MMHG | BODY MASS INDEX: 25 KG/M2 | WEIGHT: 160.06 LBS | SYSTOLIC BLOOD PRESSURE: 124 MMHG | TEMPERATURE: 98 F | HEART RATE: 68 BPM

## 2024-04-20 DIAGNOSIS — S40.022A CONTUSION OF LEFT UPPER EXTREMITY, INITIAL ENCOUNTER: Primary | ICD-10-CM

## 2024-04-20 PROCEDURE — 99284 EMERGENCY DEPT VISIT MOD MDM: CPT

## 2024-04-20 PROCEDURE — 73080 X-RAY EXAM OF ELBOW: CPT | Performed by: EMERGENCY MEDICINE

## 2024-04-20 PROCEDURE — 99283 EMERGENCY DEPT VISIT LOW MDM: CPT

## 2024-04-20 PROCEDURE — 73030 X-RAY EXAM OF SHOULDER: CPT | Performed by: EMERGENCY MEDICINE

## 2024-04-21 NOTE — DISCHARGE INSTRUCTIONS
Your initial imaging studies here in the emergency department do not show any fracture or dislocation.  However if you are still having problems over the next 1 to 2 weeks, you must follow-up with your doctor or orthopedics for reevaluation and possible reimaging.  If you cannot obtain follow-up or feel worse, please return to the ER.

## 2024-04-21 NOTE — ED INITIAL ASSESSMENT (HPI)
Patient was playing soccer, tripped over ball and landed on left elbow. Some shoulder pain. Ibuprofen approximately 1 hour ago

## 2024-04-24 NOTE — ED PROVIDER NOTES
Patient Seen in: [facility] Emergency Department      History     Chief Complaint   Patient presents with    Arm or Hand Injury     Stated Complaint: left elbow injury    Subjective:   HPI    39-year-old male presents ED for evaluation after a fall.  His playing soccer and landed on his left elbow.  Complains of pain in the left shoulder and left elbow.  No head neck trauma LOC.  No paresthesias weakness.  No other complaints.    Objective:   Past Medical History:    Acid reflux    Esophageal reflux    Hyperlipidemia    Lipid screening              Past Surgical History:   Procedure Laterality Date    Arthroscopy of joint unlisted      Knee surgery Left 2005    Arthroscopy    Vasectomy Bilateral 07/09/2021    Dr. Clark                Social History     Socioeconomic History    Marital status:    Tobacco Use    Smoking status: Never    Smokeless tobacco: Never   Vaping Use    Vaping status: Never Used   Substance and Sexual Activity    Alcohol use: Yes     Alcohol/week: 11.0 standard drinks of alcohol     Types: 11 Cans of beer per week     Comment: beer - occasionally    Drug use: No   Other Topics Concern    Caffeine Concern No              Review of Systems    Positive for stated complaint: left elbow injury  Other systems are as noted in HPI.  Constitutional and vital signs reviewed.      All other systems reviewed and negative except as noted above.    Physical Exam     ED Triage Vitals [04/20/24 2123]   /88   Pulse 78   Resp 16   Temp 98.1 °F (36.7 °C)   Temp src Temporal   SpO2 98 %   O2 Device None (Room air)       Current:/84   Pulse 68   Temp 98.1 °F (36.7 °C) (Temporal)   Resp 14   Wt 72.6 kg   SpO2 99%   BMI 25.07 kg/m²         Physical Exam  Vitals and nursing note reviewed.   Constitutional:       General: He is not in acute distress.     Appearance: He is well-developed. He is not toxic-appearing.   HENT:      Head: Normocephalic and atraumatic.   Eyes:      General:  No scleral icterus.     Conjunctiva/sclera: Conjunctivae normal.   Cardiovascular:      Rate and Rhythm: Normal rate.   Pulmonary:      Effort: Pulmonary effort is normal. No respiratory distress.   Abdominal:      General: There is no distension.   Musculoskeletal:         General: Tenderness present. No swelling, deformity or signs of injury. Normal range of motion.      Cervical back: Normal range of motion and neck supple.   Skin:     General: Skin is warm and dry.      Findings: No rash.   Neurological:      Mental Status: He is alert and oriented to person, place, and time.      Sensory: No sensory deficit.      Motor: No weakness or abnormal muscle tone.      Coordination: Coordination normal.   Psychiatric:         Behavior: Behavior normal.              ED Course   Labs Reviewed - No data to display                    MDM            -Pulse oximetry was interpreted by me and was normal.  Pulse oximeter was ordered to monitor patient for hypoxia.             -Comorbidities did add complexity to the management are mentioned in the HPI above             -DDX: Includes but not limited to fracture, dislocation        -I personally reviewed the radiographs findings and they show no fractures or dislocations  Please refer to radiology report for official interpretation      XR SHOULDER, COMPLETE (MIN 2 VIEWS), LEFT (CPT=73030)   Final Result   PROCEDURE:  XR SHOULDER, COMPLETE (MIN 2 VIEWS), LEFT (CPT=73030)       TECHNIQUE:  Multiple views were obtained.       COMPARISON:  None.       INDICATIONS:  left elbow injury       PATIENT STATED HISTORY: (As transcribed by Technologist)  Patient fell    while playing soccer and landed on his left side, he has left shoulder    pain.                               =====   CONCLUSION:  There is AC joint hypertrophy with lateral downsloping of the    acromion.       No acute fracture or dislocation left shoulder.           LOCATION:  [facility]           Dictated by (CST): Mary  MD Pamela on 4/20/2024 at 10:00 PM        Finalized by (CST): Pamela Hernandez MD on 4/20/2024 at 10:01 PM         XR ELBOW, COMPLETE (MIN 3 VIEWS), LEFT (CPT=73080)   Final Result   PROCEDURE:  XR ELBOW, COMPLETE (MIN 3 VIEWS), LEFT (CPT=73080)       TECHNIQUE:  Three views were obtained.       COMPARISON:  None.       INDICATIONS:  left elbow injury       PATIENT STATED HISTORY: (As transcribed by Technologist)  Patient fell    while playing soccer and landed on his left side, he has left elbow pain    and is unable to straighten his arm.                                   =====   CONCLUSION:  No acute fracture, dislocation or left elbow effusion.           LOCATION:  [facility]               Dictated by (CST): Pamela Hernandez MD on 4/20/2024 at 10:00 PM        Finalized by (CST): Pamela Hernandez MD on 4/20/2024 at 10:00 PM             Findings aubrey with the patient.  Patient be discharged home with supportive care instructions outpatient follow-up.                                   Medical Decision Making      Disposition and Plan     Clinical Impression:  1. Contusion of left upper extremity, initial encounter         Disposition:  Discharge  4/20/2024 10:05 pm    Follow-up:  Scar Rendon, DO  [address] [place] [address]  [phone]    Follow up            Medications Prescribed:  Discharge Medication List as of 4/20/2024 10:13 PM

## 2024-04-25 ENCOUNTER — OFFICE VISIT (OUTPATIENT)
Dept: FAMILY MEDICINE CLINIC | Facility: CLINIC | Age: 44
End: 2024-04-25
Payer: COMMERCIAL

## 2024-04-25 VITALS
SYSTOLIC BLOOD PRESSURE: 139 MMHG | WEIGHT: 168 LBS | DIASTOLIC BLOOD PRESSURE: 74 MMHG | BODY MASS INDEX: 26.37 KG/M2 | HEART RATE: 87 BPM | HEIGHT: 67 IN

## 2024-04-25 DIAGNOSIS — M25.522 LEFT ELBOW PAIN: Primary | ICD-10-CM

## 2024-04-25 PROCEDURE — 99212 OFFICE O/P EST SF 10 MIN: CPT | Performed by: FAMILY MEDICINE

## 2024-04-25 NOTE — PROGRESS NOTES
Blood pressure 139/74, pulse 87, height 5' 7\" (1.702 m), weight 168 lb (76.2 kg).          Presents today following up for left arm discomfort.  Fell while playing soccer.  Went to emergency room x-rays were negative.    Still some difficulty straightening and flexing his left elbow.  There is bruising noted.  Reports the swelling has gone down significantly minimal pain is able to sleep well.  Objective    Ecchymosis seen on volar aspect of left elbow and forearm    Range of motion slightly restricted in full extension and flexion    Assessment status post elbow and shoulder injury while playing soccer    Plan continue to rest and follow-up with orthopedics if discomfort persists referral entered

## 2024-11-05 ENCOUNTER — HOSPITAL ENCOUNTER (OUTPATIENT)
Age: 44
Discharge: HOME | End: 2024-11-05
Payer: COMMERCIAL

## 2024-11-05 VITALS
RESPIRATION RATE: 16 BRPM | DIASTOLIC BLOOD PRESSURE: 82 MMHG | TEMPERATURE: 98 F | HEART RATE: 74 BPM | SYSTOLIC BLOOD PRESSURE: 137 MMHG | OXYGEN SATURATION: 98 %

## 2024-11-05 DIAGNOSIS — J06.9 VIRAL UPPER RESPIRATORY ILLNESS: Primary | ICD-10-CM

## 2024-11-05 LAB
POCT INFLUENZA A: NEGATIVE
POCT INFLUENZA B: NEGATIVE
S PYO AG THROAT QL IA.RAPID: NEGATIVE
SARS-COV-2 RNA RESP QL NAA+PROBE: NOT DETECTED

## 2024-11-05 PROCEDURE — 99213 OFFICE O/P EST LOW 20 MIN: CPT

## 2024-11-05 PROCEDURE — 87502 INFLUENZA DNA AMP PROBE: CPT | Performed by: PHYSICIAN ASSISTANT

## 2024-11-05 PROCEDURE — 99214 OFFICE O/P EST MOD 30 MIN: CPT

## 2024-11-05 PROCEDURE — 87651 STREP A DNA AMP PROBE: CPT | Performed by: PHYSICIAN ASSISTANT

## 2024-11-05 RX ORDER — BENZONATATE 200 MG/1
200 CAPSULE ORAL 3 TIMES DAILY PRN
Qty: 20 CAPSULE | Refills: 0 | Status: SHIPPED | OUTPATIENT
Start: 2024-11-05

## 2024-11-05 NOTE — ED PROVIDER NOTES
Chief Complaint   Patient presents with    Cough/URI       History obtained from: patient   services not used     HPI:     Renaldo Pablo is a 43 year old male who presents with cough x 2-3 days.  Patient states he is otherwise feeling well and denies any additional symptoms or concerns.  Patient has been taking OTC cough drops and cough medicine.  Patient requesting strep test as he has had strep multiple times in the past as well as COVID and flu testing.  Denies fever, chills, chest pain, shortness of breath, wheezing, sore throat, headache, abdominal pain, vomiting, neck stiffness, rash.  Denies sick contacts.    PMH  Past Medical History:    Acid reflux    Esophageal reflux    Hyperlipidemia    Lipid screening       PFSH    PFSH asessment screens reviewed and agree.  Nurses notes reviewed I agree with documentation.    Family History   Problem Relation Age of Onset    Cancer Other         family h/o     Family history reviewed with patient/caregiver and is not pertinent to presenting problem.  Social History     Socioeconomic History    Marital status:      Spouse name: Not on file    Number of children: Not on file    Years of education: Not on file    Highest education level: Not on file   Occupational History    Not on file   Tobacco Use    Smoking status: Never    Smokeless tobacco: Never   Vaping Use    Vaping status: Never Used   Substance and Sexual Activity    Alcohol use: Yes     Alcohol/week: 11.0 standard drinks of alcohol     Types: 11 Cans of beer per week     Comment: beer - occasionally    Drug use: No    Sexual activity: Not on file   Other Topics Concern     Service Not Asked    Blood Transfusions Not Asked    Caffeine Concern No    Occupational Exposure Not Asked    Hobby Hazards Not Asked    Sleep Concern Not Asked    Stress Concern Not Asked    Weight Concern Not Asked    Special Diet Not Asked    Back Care Not Asked    Exercise Not Asked    Bike Helmet Not  Asked    Seat Belt Not Asked    Self-Exams Not Asked   Social History Narrative    Not on file     Social Drivers of Health     Financial Resource Strain: Not on file   Food Insecurity: Not on file   Transportation Needs: Not on file   Physical Activity: Not on file   Stress: Not on file   Social Connections: Not on file   Housing Stability: Not on file         ROS:   Positive for stated complaint: Cough  All other systems reviewed and negative except as noted above.  Constitutional and Vital Signs Reviewed.    Physical Exam:     Findings:    /82   Pulse 74   Temp 97.6 °F (36.4 °C) (Temporal)   Resp 16   SpO2 98%   GENERAL: well developed, no acute distress, non-toxic appearing   SKIN: good skin turgor, no obvious rashes  HEAD: normocephalic, atraumatic  EYES: sclera non-icteric bilaterally, conjunctiva clear bilaterally  EARS: canals clear bilaterally, TMs clear bilaterally  NOSE: nasal turbinates pink, normal mucosa  OROPHARYNX: MMM, pharynx clear, no exudates or swelling, uvula midline, no tongue elevation, maintaining airway and secretions  NECK: supple, no lymphadenopathy, no nuchal rigidity, no trismus, no edema, phonation normal    CARDIO: RRR, normal heart sounds   LUNGS: clear to auscultation bilaterally, no increased WOB, no rales, rhonchi, or wheezes  EXTREMITIES: no cyanosis or edema, BREWER without difficulty  NEURO: no focal deficits  PSYCH: alert and oriented x3, answering questions appropriately, mood appropriate    MDM/Assessment/Plan:   Orders for this encounter:    Orders Placed This Encounter    Rapid Strep A - ID NOW     Order Specific Question:   Release to patient     Answer:   Immediate    Rapid SARS-CoV-2 by PCR     Order Specific Question:   Release to patient     Answer:   Immediate    POCT Flu Test     Order Specific Question:   Release to patient     Answer:   Immediate    benzonatate 200 MG Oral Cap     Sig: Take 1 capsule (200 mg total) by mouth 3 (three) times daily as needed  for cough.     Dispense:  20 capsule     Refill:  0       Labs performed this visit:  Recent Results (from the past 10 hours)   Rapid Strep A - ID NOW    Collection Time: 11/05/24  5:11 PM    Specimen: Throat; Other   Result Value Ref Range    Strep A by PCR Negative Negative   Rapid SARS-CoV-2 by PCR    Collection Time: 11/05/24  5:11 PM    Specimen: Nares; Other   Result Value Ref Range    Rapid SARS-CoV-2 by PCR Not Detected Not Detected   POCT Flu Test    Collection Time: 11/05/24  5:11 PM    Specimen: Nares; Other   Result Value Ref Range    POCT INFLUENZA A Negative Negative    POCT INFLUENZA B Negative Negative       Imaging performed this visit:  No orders to display       Medical Decision Making  DDx includes viral URI versus covid versus flu versus strep pharyngitis versus other. Patient is overall very well-appearing with stable vitals and tolerating oral intake. No hypoxia or signs of respiratory distress. Covid, flu, and strep tests negative. Discussed supportive care for suspected viral illness including rest, increased fluid intake, and OTC Tylenol/Motrin as needed for pain or fevers.  Rx Tessalon for cough as needed.  Discussed infection control.  Instructed patient to go directly to nearest ER with any worsening or concerning symptoms.  Follow-up with PCP.    Amount and/or Complexity of Data Reviewed  Labs: ordered.    Risk  OTC drugs.  Prescription drug management.          Diagnosis:    ICD-10-CM    1. Viral upper respiratory illness  J06.9           All results reviewed and discussed with patient/patient's family. Patient/patient's family verbalize excellent understanding of instructions and feels comfortable with plan. All of patient's/patient's family's questions were addressed.   See AVS for detailed discharge instructions for your condition today.    Follow Up with:  Scar Rendon, DO  [address] [place] [address]  [phone]            Note: This document was dictated  using Dragon medical dictation software.  Proofreading was performed to the best of my ability, but errors may be present.    Loreto Harman PA-C

## (undated) DEVICE — Device: Brand: DEFENDO AIR/WATER/SUCTION AND BIOPSY VALVE

## (undated) DEVICE — ENDOSCOPY PACK UPPER: Brand: MEDLINE INDUSTRIES, INC.

## (undated) NOTE — MR AVS SNAPSHOT
[facility] [address]  [address]  [address] [facility]  [phone]               Thank you for choosing us for your health care visit with Ori Neumann. DO Justice.   We are glad to serve you and happy to provide you with this sum service number located on your ID card. To schedule Physical Therapy at any of the [facility] facilities, please call [phone]. Center for SYD NICOLE [facility]  [address]  [address] [facility] Don’t eat while distracted and slow down. Avoid over sized portions. Don’t eat while when you’re bored.      EAT THESE FOODS MORE OFTEN: EAT THESE FOODS LESS OFTEN:   Make half your plate fruits and vegetables Highly refined, white starches including wh

## (undated) NOTE — MR AVS SNAPSHOT
[address]  [address] [facility] [address]  [phone]  [phone]               Thank you for choosing us for your health care visit with Jenny Koroma PT.   We are glad to serve you and happy to provide you with this s Please arrive at your scheduled appointment time. Wear comfortable, loose fitting clothing.             Mar 30, 2017  6:15 PM   [facility] Therapy Visit By Therapist with JOSÉ [facility] in [address] [place], [place] [address]o

## (undated) NOTE — LETTER
[facility]   Administracion de Cristiana Henley, O ___________________________________ mychal De Jesus se                              (Representante)    administre anezaida daivd el procedimiento, op presion, dificultad al orinar, dolor de shira, disminucion del ritmo cardiaco del gwendolyn. Riesgos remotos incluyen: infecciones, alto bloqueo back, sangramiento del canal back, convulciones, parocardiaco y [place].   6. CONCIENCIA: Comprendo que es posib (Firma del Testigo)                                                                       ___________________________________________     ___________________________________________________

## (undated) NOTE — LETTER
01/11/21        Janas Finders  [address]      Dear Tee Sandhu,    [facility] records indicate that you have outstanding lab work and or testing that was ordered for you and has not yet been completed:  Orders Placed This Enc

## (undated) NOTE — LETTER
[facility]  Administration of Anesthesia  1. I, Joseph Ontiveros, or _________________________________ acting on his behalf, (Patient) (Dependent/Representative) request to receive anesthesia for my pending procedure/operation/treat infections, high spinal block, spinal bleeding, seizure, cardiac arrest and death. 7. AWARENESS: I understand that it is possible (but unlikely) to have explicit memory of events from the operating room while under general anesthesia.   8. ELECTROCONVULSIV unconscious pt /Relationship    My signature below affirms that prior to the time of the procedure, I have explained to the patient and/or his/her guardian, the risks and benefits of undergoing anesthesia, as well as any reasonable alternatives.     _______

## (undated) NOTE — LETTER
04/12/21        Andria Lat  [address]      Dear Baldemar Parmar,    [facility] records indicate that you have outstanding lab work and or testing that was ordered for you and has not yet been completed:  Orders Placed This Enc

## (undated) NOTE — Clinical Note
Please find previous copy of sleep study performed years ago.   States that this was performed at home but ordered by an [facility] clinic physician

## (undated) NOTE — MR AVS SNAPSHOT
[address]  [address] [facility] [address]  [phone]  [phone]               Thank you for choosing us for your health care visit with Mitch Chairez PT.   We are glad to serve you and happy to provide you with this s [phone]           Please arrive at your scheduled appointment time. Wear comfortable, loose fitting clothing.             Apr 11, 2017  7:00 AM   [facility] Visit By Therapist with Emily Diallo [address] in [place]

## (undated) NOTE — MR AVS SNAPSHOT
[address]  [address] [facility] [address]  [phone]               Thank you for choosing us for your health care visit with Yessenia Faye PT.   We are glad to serve you and happy to provide you with this s Please arrive at your scheduled appointment time. Wear comfortable, loose fitting clothing.             Mar 27, 2017  4:00 PM   [place] [facility] Visit By Therapist with JOSÉ Barrera in [facility]

## (undated) NOTE — LETTER
[address], [place], [address] [place] [address]  1.  Por Rachel Sandoval MD [facility] y martinez(s) Asistente(s), a llevar a cabo la siguiente operación o agentes del hospital, sino profesionales médicos independientes a quienes el hospital ha permitido usar sridhar instalaciones para [address] y tratamiento de sridhar Vanam[place]isa.   9. PACIENTES A QUIENES SE LES PRACTICARÁ UN PROCEDIMIENTO DE ESTERILIZACÓN: Entiend respondido a las preguntas del(la) paciente(My signature below affirms that prior to the time of the procedure, I have explained to the patient and/or his/her guardian, therisks and benefits involved in the proposed treatment and any reasonable alternative

## (undated) NOTE — LETTER
11/10/20      Neeta Crabtree  [address]      Dear Royal Ann,    [facility] records indicate that you have outstanding lab work and or testing that was ordered for you and has not yet been completed:  Orders Placed This Encou

## (undated) NOTE — MR AVS SNAPSHOT
[address]  [address] [facility] [address]  [phone]  [phone]               Thank you for choosing us for your health care visit with Rea Oliver PT.   We are glad to serve you and happy to provide you with this s Please arrive at your scheduled appointment time. Wear comfortable, loose fitting clothing.             Apr 07, 2017  7:00 AM   [facility] Therapy Visit By Therapist with Emily Tracy 72 in [facility]

## (undated) NOTE — Clinical Note
I previously asked for copies of his sleep study. 1 attempt was made to contact the facility but the facility was closed. I need to see this information please. Please try to contact facility once again to get a copy of his recent sleep study.

## (undated) NOTE — LETTER
Date: 10/26/2023    Patient Name: Talia Martinez          To Whom it may concern: This letter has been written at the patient's request. The above patient was seen at the [facility] for treatment of a medical condition. This patient should be excused from attending work on 10/26/2023. The patient may return to work on 10/27/2023.         Sincerely,          DUYEN Villavicencio

## (undated) NOTE — LETTER
5/26/2020              [address]         Dear Pia Pathak,    Please advise your employer that you may return to work without restrictions. Sincerely,      Carrington Harley.  DO EHSAN Rendon

## (undated) NOTE — LETTER
Date: 4/18/2023    Patient Name: Lisa Pearson          To Whom it may concern: This letter has been written at the patient's request. The above patient was seen at the [facility] for treatment of a medical condition. This patient should be excused from attending work on 04/18/2023. The patient may return to work on 04/19/2023.         Sincerely,          Ori Smith APRN

## (undated) NOTE — MR AVS SNAPSHOT
[address]  [address] [facility] [phone]               Thank you for choosing us for your health care visit with Regan Love PTA.   We are glad to serve you and happy to provide you with this summ Please arrive at your scheduled appointment time. Wear comfortable, loose fitting clothing.             Apr 14, 2017  7:00 AM   [facility] Visit By Therapist with Emily Walker in [address] [place] [address]

## (undated) NOTE — LETTER
06/12/21        Glo Manningn  [address]  [place] [address] Rae registros indican que tiene análisis clínicos pendientes y/o pruebas que se ordenaron en martinez nombre que no se simms completado.